# Patient Record
Sex: MALE | Race: WHITE | Employment: FULL TIME | URBAN - METROPOLITAN AREA
[De-identification: names, ages, dates, MRNs, and addresses within clinical notes are randomized per-mention and may not be internally consistent; named-entity substitution may affect disease eponyms.]

---

## 2021-05-21 ENCOUNTER — TRANSFERRED RECORDS (OUTPATIENT)
Dept: HEALTH INFORMATION MANAGEMENT | Facility: CLINIC | Age: 46
End: 2021-05-21
Payer: COMMERCIAL

## 2021-08-03 ENCOUNTER — TRANSFERRED RECORDS (OUTPATIENT)
Dept: HEALTH INFORMATION MANAGEMENT | Facility: CLINIC | Age: 46
End: 2021-08-03
Payer: COMMERCIAL

## 2021-08-03 ASSESSMENT — MIFFLIN-ST. JEOR: SCORE: 2376.32

## 2021-08-04 ENCOUNTER — TELEPHONE (OUTPATIENT)
Dept: ENDOCRINOLOGY | Facility: CLINIC | Age: 46
End: 2021-08-04

## 2021-08-04 NOTE — TELEPHONE ENCOUNTER
Called patient to discuss insurance information as entered in Epic, [employer]. Patient states this is incorrect. He has [employer] plan as he works for [employer]. Provider tele # [phone]. Call ref # [id number]. [employer] for prior authorization request form, fax to [phone].    Patient has coverage. Patient unable to have surgery at [facility] with Dr. High as he is booked to 1/17/21. Patient states he would like surgery this year yet but the sooner the better as he will be available any time. Have requested clinicals be faxed to me at [phone].

## 2021-08-10 ENCOUNTER — TELEPHONE (OUTPATIENT)
Dept: ENDOCRINOLOGY | Facility: CLINIC | Age: 46
End: 2021-08-10

## 2021-08-10 NOTE — TELEPHONE ENCOUNTER
Left VM message asking patient to call the Call Center to enter his insurance information as he will be having sleeve gastrectomy at the , currently working with [facility] in [facility] but unable to have surgery there since Dr. High is booking out to December, possibly later. Patient wants surgery this year.

## 2021-08-12 ENCOUNTER — TELEPHONE (OUTPATIENT)
Dept: ENDOCRINOLOGY | Facility: CLINIC | Age: 46
End: 2021-08-12

## 2021-08-13 ENCOUNTER — TELEPHONE (OUTPATIENT)
Dept: ENDOCRINOLOGY | Facility: CLINIC | Age: 46
End: 2021-08-13

## 2021-08-13 NOTE — TELEPHONE ENCOUNTER
Called patient to discuss surgery date for sleeve with Dr. High. Scheduled 10/12/21 [place].  Discussed I will call him to set up PAC video visit as it gets closer to the OR date. He would like to do his COVID testing closer to home, [place].

## 2021-08-16 ENCOUNTER — CARE COORDINATION (OUTPATIENT)
Dept: ENDOCRINOLOGY | Facility: CLINIC | Age: 46
End: 2021-08-16

## 2021-08-16 DIAGNOSIS — E66.01 MORBID OBESITY (H): Primary | ICD-10-CM

## 2021-08-18 ENCOUNTER — TELEPHONE (OUTPATIENT)
Dept: ENDOCRINOLOGY | Facility: CLINIC | Age: 46
End: 2021-08-18

## 2021-08-18 NOTE — TELEPHONE ENCOUNTER
Called patient to let him know we no longer have OR time on 10/12, which is the date I offered him for a sleeve, will try for 10/26/21.  Told patient we needs labs yet, CMP and lipid panel reflex to direct LDL fasting. I will fax these to [facility] and asked him to stop by to get these drawn.   Cardiology consult by Dr. Ryan Gonsalez dated 5/3/21 does not state he has clearance for bariatric surgery. I asked him to call Dr. Gonsalez's office and see if he could addend the note or put in another note clearing him. He will do this.

## 2021-08-22 ENCOUNTER — HEALTH MAINTENANCE LETTER (OUTPATIENT)
Age: 46
End: 2021-08-22

## 2021-08-25 ENCOUNTER — TELEPHONE (OUTPATIENT)
Dept: ENDOCRINOLOGY | Facility: CLINIC | Age: 46
End: 2021-08-25

## 2021-08-25 NOTE — TELEPHONE ENCOUNTER
Received a VM message from Karla PONCE at [facility] [phone] stating Dr. Gonsalez has cleared patient from a cardiology standpoint. I called [facility] and asked if the RN's note stating the doctor is giving clearance could be faxed to me at [phone].

## 2021-08-26 ENCOUNTER — CARE COORDINATION (OUTPATIENT)
Dept: ENDOCRINOLOGY | Facility: CLINIC | Age: 46
End: 2021-08-26

## 2021-08-26 ENCOUNTER — PREP FOR PROCEDURE (OUTPATIENT)
Dept: ENDOCRINOLOGY | Facility: CLINIC | Age: 46
End: 2021-08-26

## 2021-08-26 VITALS — BODY MASS INDEX: 44.1 KG/M2 | HEIGHT: 71 IN | WEIGHT: 315 LBS

## 2021-08-26 DIAGNOSIS — Z01.818 PREOP TESTING: ICD-10-CM

## 2021-08-26 DIAGNOSIS — E66.01 MORBID OBESITY (H): Primary | ICD-10-CM

## 2021-08-26 RX ORDER — CEFAZOLIN SODIUM IN 0.9 % NACL 3 G/100 ML
3 INTRAVENOUS SOLUTION, PIGGYBACK (ML) INTRAVENOUS
Status: CANCELLED | OUTPATIENT
Start: 2021-08-26

## 2021-08-26 RX ORDER — ONDANSETRON 2 MG/ML
4 INJECTION INTRAMUSCULAR; INTRAVENOUS
Status: CANCELLED | OUTPATIENT
Start: 2021-08-26

## 2021-08-26 RX ORDER — CEFAZOLIN SODIUM IN 0.9 % NACL 3 G/100 ML
3 INTRAVENOUS SOLUTION, PIGGYBACK (ML) INTRAVENOUS SEE ADMIN INSTRUCTIONS
Status: CANCELLED | OUTPATIENT
Start: 2021-08-26

## 2021-08-26 RX ORDER — ACETAMINOPHEN 325 MG/1
975 TABLET ORAL ONCE
Status: CANCELLED | OUTPATIENT
Start: 2021-08-26 | End: 2021-08-26

## 2021-08-31 ENCOUNTER — PREP FOR PROCEDURE (OUTPATIENT)
Dept: ENDOCRINOLOGY | Facility: CLINIC | Age: 46
End: 2021-08-31

## 2021-08-31 DIAGNOSIS — E66.01 MORBID OBESITY (H): Primary | ICD-10-CM

## 2021-08-31 RX ORDER — CEFAZOLIN SODIUM IN 0.9 % NACL 3 G/100 ML
3 INTRAVENOUS SOLUTION, PIGGYBACK (ML) INTRAVENOUS SEE ADMIN INSTRUCTIONS
Status: CANCELLED | OUTPATIENT
Start: 2021-08-31

## 2021-08-31 RX ORDER — ACETAMINOPHEN 325 MG/1
975 TABLET ORAL ONCE
Status: CANCELLED | OUTPATIENT
Start: 2021-08-31 | End: 2021-08-31

## 2021-08-31 RX ORDER — ONDANSETRON 2 MG/ML
4 INJECTION INTRAMUSCULAR; INTRAVENOUS
Status: CANCELLED | OUTPATIENT
Start: 2021-08-31

## 2021-08-31 RX ORDER — CEFAZOLIN SODIUM IN 0.9 % NACL 3 G/100 ML
3 INTRAVENOUS SOLUTION, PIGGYBACK (ML) INTRAVENOUS
Status: CANCELLED | OUTPATIENT
Start: 2021-08-31

## 2021-09-01 ENCOUNTER — TELEPHONE (OUTPATIENT)
Dept: ENDOCRINOLOGY | Facility: CLINIC | Age: 46
End: 2021-09-01

## 2021-09-01 DIAGNOSIS — Z01.812 BLOOD TESTS PRIOR TO TREATMENT OR PROCEDURE: Primary | ICD-10-CM

## 2021-09-01 DIAGNOSIS — E66.01 MORBID OBESITY (H): Primary | ICD-10-CM

## 2021-09-01 NOTE — TELEPHONE ENCOUNTER
FUTURE VISIT INFORMATION      SURGERY INFORMATION:    DOS 10/26 with    Christiano High MD    RECORDS REQUESTED FROM:       Primary Care Provider: RileyaCaramiro    Most recent EKG+ Tracing: 3/12/21- [facility]    Most recent ECHO: 4/13/21- [facility]    Most recent [facility]  [facility]

## 2021-09-02 ENCOUNTER — PREP FOR PROCEDURE (OUTPATIENT)
Dept: ENDOCRINOLOGY | Facility: CLINIC | Age: 46
End: 2021-09-02

## 2021-09-02 DIAGNOSIS — E66.01 MORBID OBESITY (H): Primary | ICD-10-CM

## 2021-09-02 RX ORDER — CEFAZOLIN SODIUM IN 0.9 % NACL 3 G/100 ML
3 INTRAVENOUS SOLUTION, PIGGYBACK (ML) INTRAVENOUS
Status: CANCELLED | OUTPATIENT
Start: 2021-09-02

## 2021-09-02 RX ORDER — CEFAZOLIN SODIUM IN 0.9 % NACL 3 G/100 ML
3 INTRAVENOUS SOLUTION, PIGGYBACK (ML) INTRAVENOUS SEE ADMIN INSTRUCTIONS
Status: CANCELLED | OUTPATIENT
Start: 2021-09-02

## 2021-09-02 RX ORDER — ACETAMINOPHEN 325 MG/1
975 TABLET ORAL ONCE
Status: CANCELLED | OUTPATIENT
Start: 2021-09-02 | End: 2021-09-02

## 2021-09-02 RX ORDER — ONDANSETRON 2 MG/ML
4 INJECTION INTRAMUSCULAR; INTRAVENOUS
Status: CANCELLED | OUTPATIENT
Start: 2021-09-02

## 2021-09-03 PROBLEM — E66.01 MORBID OBESITY (H): Status: ACTIVE | Noted: 2021-09-03

## 2021-09-21 ENCOUNTER — TELEPHONE (OUTPATIENT)
Dept: ENDOCRINOLOGY | Facility: CLINIC | Age: 46
End: 2021-09-21

## 2021-09-21 ENCOUNTER — MYC MEDICAL ADVICE (OUTPATIENT)
Dept: ENDOCRINOLOGY | Facility: CLINIC | Age: 46
End: 2021-09-21

## 2021-09-21 NOTE — TELEPHONE ENCOUNTER
LA Paper Work    - LA paperwork received 9/21/21    - Surgery Date: 10/26/21    - First Post-Op Date: unknown at this time    - Filled Out: 9/21/21    - Signed by Provider: 9/23/21    - Faxed to: (Fax Number & Company) 9/23/21    - Copy sent to scanning and original in file folder in Clinic 9/23/21.     Meenu Morales CMA

## 2021-09-21 NOTE — TELEPHONE ENCOUNTER
Per patient request asked that lab orders be faxed to [facility], phone [phone], fax [phone], lipid panel, CMP and COVID. Needs to be fasting for lipid panel. To have COVID testing done on 10/22, surgery scheduled 10/26/21.   HLD (hyperlipidemia)

## 2021-10-01 ENCOUNTER — TELEPHONE (OUTPATIENT)
Dept: ENDOCRINOLOGY | Facility: CLINIC | Age: 46
End: 2021-10-01

## 2021-10-01 RX ORDER — DILTIAZEM HYDROCHLORIDE 360 MG/1
360 CAPSULE, EXTENDED RELEASE ORAL DAILY
Status: ON HOLD | COMMUNITY
End: 2021-10-27

## 2021-10-01 RX ORDER — LOSARTAN POTASSIUM AND HYDROCHLOROTHIAZIDE 25; 100 MG/1; MG/1
1 TABLET ORAL DAILY
COMMUNITY

## 2021-10-01 RX ORDER — MODAFINIL 200 MG/1
200 TABLET ORAL DAILY PRN
COMMUNITY

## 2021-10-01 RX ORDER — TESTOSTERONE GEL, 1% 10 MG/G
GEL TRANSDERMAL DAILY
COMMUNITY

## 2021-10-01 RX ORDER — NALTREXONE HYDROCHLORIDE 50 MG/1
25 TABLET, FILM COATED ORAL 2 TIMES DAILY
COMMUNITY
End: 2021-11-30

## 2021-10-01 RX ORDER — PRAVASTATIN SODIUM 20 MG
20 TABLET ORAL EVERY EVENING
Status: ON HOLD | COMMUNITY
End: 2021-10-27

## 2021-10-01 RX ORDER — GABAPENTIN 300 MG/1
600 CAPSULE ORAL 3 TIMES DAILY
COMMUNITY
End: 2021-10-01

## 2021-10-01 NOTE — PHARMACY - PREOPERATIVE ASSESSMENT CENTER
Preoperative Assessment Center Medication History Note    Medication history completed on October 1, 2021 by this writer. See Epic admission navigator for prior to admission medications. Operating room staff will still need to confirm medications and last dose information on day of surgery.     Medication history interview sources  Patient interview: Yes  Care Everywhere records: Yes  Surescripts pharmacy refill records: Yes  Other (if applicable): None    Changes made to PTA medication list (reason)  Added:   --Aspirin  -- Diltiazem  --Hyzaar  --Modafinil  --Naltrexone  --Pravastatin  -- testosterone    Deleted: None    Changed: None    Additional medication history information (including reliability of information, actions taken by pharmacist):    -- No recent (within 30 days) course of antibiotics  -- No recent (within 30 days) course of systemic steroids  -- Patient declines being on any other prescription or over-the-counter medications  -- patient taking aspirin for preventative   -- patient will need to hold naltrexone 4 days prior to procedure as he is taking it for weight loss.     Prior to Admission medications    Medication Sig Last Dose Taking? Auth Provider   aspirin (ASA) 81 MG EC tablet Take 81 mg by mouth daily Taking Yes Unknown, Entered By History   diltiazem ER (TIAZAC) 360 MG 24 hr ER beaded capsule Take 360 mg by mouth daily Taking Yes Unknown, Entered By History   losartan-hydrochlorothiazide (HYZAAR) 100-25 MG tablet Take 1 tablet by mouth daily Taking Yes Unknown, Entered By History   modafinil (PROVIGIL) 200 MG tablet Take 200 mg by mouth daily as needed  Taking Yes Unknown, Entered By History   naltrexone (DEPADE/REVIA) 50 MG tablet Take 25 mg by mouth 2 times daily  Taking Yes Unknown, Entered By History   pravastatin (PRAVACHOL) 20 MG tablet Take 20 mg by mouth every evening  Taking Yes Unknown, Entered By History   testosterone (ANDROGEL/TESTIM) 50 MG/5GM (1%) topical gel Place onto the  skin daily Taking Yes Unknown, Entered By History     Medication history completed by: Elmo Cronin RP

## 2021-10-01 NOTE — TELEPHONE ENCOUNTER
Called patient, went to . Said I have made his 1-week and 1-month post-op appointments with Roxy Olmedo RD and Татьяна Smith NP on 11/2 and 11/30.   Asked that he go to his primary care on 11/1 to get his vitals taken as Татьяна's visit will be video.

## 2021-10-05 ENCOUNTER — PRE VISIT (OUTPATIENT)
Dept: SURGERY | Facility: CLINIC | Age: 46
End: 2021-10-05

## 2021-10-05 ENCOUNTER — VIRTUAL VISIT (OUTPATIENT)
Dept: SURGERY | Facility: CLINIC | Age: 46
End: 2021-10-05
Attending: SURGERY
Payer: COMMERCIAL

## 2021-10-05 ENCOUNTER — ANESTHESIA EVENT (OUTPATIENT)
Dept: SURGERY | Facility: CLINIC | Age: 46
End: 2021-10-05

## 2021-10-05 DIAGNOSIS — Z01.818 PREOP EXAMINATION: Primary | ICD-10-CM

## 2021-10-05 DIAGNOSIS — E66.01 MORBID OBESITY (H): ICD-10-CM

## 2021-10-05 PROCEDURE — 99204 OFFICE O/P NEW MOD 45 MIN: CPT | Mod: 95 | Performed by: NURSE PRACTITIONER

## 2021-10-05 ASSESSMENT — PAIN SCALES - GENERAL: PAINLEVEL: MILD PAIN (2)

## 2021-10-05 ASSESSMENT — LIFESTYLE VARIABLES: TOBACCO_USE: 1

## 2021-10-05 NOTE — H&P
Pre-Operative H & P     CC:  Preoperative exam to assess for increased cardiopulmonary risk while undergoing surgery and anesthesia.    Date of Encounter: 10/5/2021  Primary Care Physician:  No primary care provider on file.     Reason for visit:   Encounter Diagnoses   Name Primary?     Preop examination Yes     Morbid obesity (H)          HPI  Pete Amaro is a 46 year old male who presents for pre-operative H & P in preparation for GASTRECTOMY, SLEEVE, LAPAROSCOPIC and HERNIORRHAPHY, HIATAL, LAPAROSCOPIC on 10/26/21 by Dr. High  at [facility].     Pete Amaro 46 year old male with bicuspid aortic valve, aortic stenosis, hyperlipidemia, tachycardia, hypertension and history of smoking that is morbidly obese.  He has consulted with Dr. High for bariatric surgery at his [facility] office, but surgery has been scheduled here due to their covid restrictions.  He notes that he has been overweight since his teenage years.  He is interested in weight loss surgery to improve his quality of life, improve health (hypertension, lipids),  increase energy, improve sleep, improve chronic pain, become more mobile.  He has attempted weight loss in the past with Weight Watchers, Herbalife, Prolife, Medifast, keto diet, weight loss medications and exercise.  He once lost 150lbs, but wasn't able to keep it off.  The above listed procedure has now been recommended.      History is obtained from the patient and chart review      Hx of abnormal bleeding or anti-platelet use: aspirin        Prior to Admission Medications  Current Outpatient Medications   Medication Sig Dispense Refill     aspirin (ASA) 81 MG EC tablet Take 81 mg by mouth daily       diltiazem ER (TIAZAC) 360 MG 24 hr ER beaded capsule Take 360 mg by mouth daily       losartan-hydrochlorothiazide (HYZAAR) 100-25 MG tablet Take 1 tablet by mouth daily       modafinil (PROVIGIL) 200 MG tablet Take  200 mg by mouth daily as needed        naltrexone (DEPADE/REVIA) 50 MG tablet Take 25 mg by mouth 2 times daily        pravastatin (PRAVACHOL) 20 MG tablet Take 20 mg by mouth every evening        testosterone (ANDROGEL/TESTIM) 50 MG/5GM (1%) topical gel Place onto the skin daily         Family History  Family History   Problem Relation Age of Onset     No Known Problems Mother      Diabetes Father      Anesthesia Reaction No family hx of      Thrombosis No family hx of            Past Medical History:   Diagnosis Date     Aortic stenosis      Benign essential hypertension      Bicuspid aortic valve      High cholesterol      Morbid obesity (H)       Past Surgical History:   Procedure Laterality Date     VASECTOMY       wisdom teeth        Allergies   Allergen Reactions     Lisinopril Cough      Social History     Tobacco Use     Smoking status: Former Smoker     Types: Cigarettes     Smokeless tobacco: Never Used   Substance Use Topics     Alcohol use: Not Currently      Wt Readings from Last 1 Encounters:   08/03/21 147.4 kg (325 lb)        Anesthesia Evaluation   Pt has not had prior anesthetic.         ROS/MED History  The complete review of systems is negative other than noted in the HPI or here.   ENT/Pulmonary:     (+) HARLAN risk factors, snores loudly, obese, tobacco use, Past use,  (-) recent URI   Neurologic:  - neg neurologic ROS     Cardiovascular: Comment: History of tachycardia, bicuspid aortic valve    (+) Dyslipidemia hypertension-----valvular problems/murmurs type: AS Previous cardiac testing   Echo: Date: 4/2021 Results:    Stress Test: Date: 3/2021 Results:    ECG Reviewed: Date: Results:    Cath: Date: Results:      METS/Exercise Tolerance:     Hematologic:  - neg hematologic  ROS  (-) history of blood clots and history of blood transfusion   Musculoskeletal: Comment: Chronic low back pain      GI/Hepatic:  - neg GI/hepatic ROS     Renal/Genitourinary:  - neg Renal ROS     Endo:     (+) Chronic  steroid usage for Other. Date most recently used: daily testosterone gel. Obesity,     Psychiatric/Substance Use:  - neg psychiatric ROS  (-) alcohol abuse history   Infectious Disease:  - neg infectious disease ROS  (-) Recent Fever   Malignancy:  - neg malignancy ROS     Other:  - neg other ROS    (+) , H/O Chronic Pain,                PAC Discussion and Assessment    ASA Classification: 3  Case is suitable for: [place]  Anesthetic techniques and relevant risks discussed: GA                    Virtual visit -  No vitals were obtained       Physical Exam  Constitutional: Awake, alert, cooperative, no apparent distress, and appears stated age.  Eyes: Pupils equal  HENT: Normocephalic  Respiratory: non labored breathing   Neurologic: Awake, alert, oriented to name, place and time.   Neuropsychiatric: Calm, cooperative. Normal affect.        LABS/DIAGNOSTIC STUDIES:   All labs and imaging personally reviewed       Echocardiogram  4/2021  Summary:     * The estimated ejection fraction is 65-70%.     * There is moderate concentric left ventricular hypertrophy.     * Normal right ventricular systolic function.     * There is mild aortic stenosis with a peak velocity of  291 cm/s, mean   gradient of  16.71 mmHg, aortic valve area of  2.07 cm2, and an NDSI of     0.64.     * The aortic valve is bicuspid.     * Doppler interrogation of the TV is inadequate to assess pulmonary   artery   systolic pressure.     * The IVC is normal in size (< 2.1 cm), > 50% respiratory variance, RA   pressure normal at 3 mmHg.     * There is no pericardial effusion visualized.     * No prior study available for comparison.     stress test 3/2021  Summary     * Normal dobutamine stress echocardiogram with no inducible regional wall   motion abnormalities.     * Hyperdynamic LV systolic function estimated at 65 to 70%.     * Bicuspid aortic valve with mild calcific aortic stenosis with fusion of   the noncoronary and left coronary cusps.     *  Stress EKG with no significant ST changes or arrhythmias concerning for   inducible ischemia.   Stress Echo Findings     Review of images at peak dose dobutamine demonstrates adequate   augmentation   of all LV myocardial segments. There are no inducible regional wall motion   abnormalities visualized. Patient ejection fraction increased from 65-70%   to   75-80%.   ECG Findings     Baseline electrocardiogram demonstrates normal sinus rhythm with a   baseline   heart rate of 91 bpm. There is an isolated premature atrial complex. There   are   no obvious ischemic ST changes seen at baseline. Review of ECG during   dobutamine infusion and at peak heart rate did not demonstrate any   significant   ST changes or arrhythmias concerning for inducible ischemia.       Labs:  Outside labs 10/18/21  Color, Urine Yellow  Yellow    Clarity, Urine Clear  Clear    Specific Gravity, Urine 1.005 - 1.030  >=1.030    Glucose, Urine Negative  Negative    Bilirubin, Urine Negative  Negative    Ketone, Urine Negative  Negative    Blood, Urine Negative  Negative    pH, Urine 5.0 - 8.5 pH 5.5    Protein, Urine Negative  TraceAbnormal     Urobilinogen, Urine <2.0 EU/dL 0.2    Nitrite, Urine Negative  Negative    Leukocyte Esterase, Urine Negative  Negative        Sodium 136 - 146 mmol/L 140    Potassium 3.5 - 5.1 mmol/L 3.9    Chloride 98 - 107 mmol/L 106    CO2 22 - 29 mmol/L 24    Anion Gap 10.0 - 20.0 mmol/L 13.9    Creatinine 0.72 - 1.25 mg/dL 0.78    Blood Urea Nitrogen 10.0 - 20.0 mg/dL 23.9High     BUN/Creatinine Ratio 11.70 - 22.90 ratio 30.64High     Calcium 8.6 - 10.5 mg/dL 9.2    Glucose 70 - 100 mg/dL 108High     eGFR >=60 mL/min/1.73m(2) >60    Total Protein 6.0 - 8.0 g/dL 7.2    Albumin 3.5 - 5.0 g/dL 4.1    Globulin 2.0 - 3.5 g/dL 3.1    Albumin/Globulin Ratio 1.0 - 2.0  1.3    Aspartate Aminotransferase (AST) 5 - 41 U/L 27    Alanine Aminotransferase (ALT) 8 - 45 U/L 40    Alkaline Phosphatase 50 - 136 U/L 60    Bilirubin,  Total 0.2 - 1.2 mg/dL 0.4    eCrCl (Rx) - Adults  mL/min 170.9      WBC Count 3.3 - 10.9 10(3)/uL 9.0    RBC Count 3.82 - 5.62 10(6)/uL 5.77High     Hemoglobin 12.9 - 16.9 g/dL 17.4High     Hematocrit 37.0 - 47.0 % 49.9High     MCV 82.1 - 99.1 fL 86.5    MCH 28.4 - 34.8 pg 30.2    MCHC 32.0 - 36.0 g/dL 34.9    RDW 10.0 - 16.2 % 13.7    Platelets 130 - 368 10(3)/uL 200    MPV 6.5 - 10.0 fL 8.8    % Neutrophils 47.0 - 80.0 % 53.0    % Lymphocytes 12.0 - 42.0 % 40.0    % Midrange 5.0 - 9.0 % 7.0    Abs Neutrophils 1.0 - 7.0 10(3)/uL 4.8    Abs Lymphocytes 1.0 - 3.2 10(3)/uL 3.6High     Abs Midrange 0.0 - 1.0 10(3)/uL 0.6            The patient's records and results personally reviewed by this provider.     Outside records reviewed from: care everywhere      ASSESSMENT and PLAN    Pete Amaro 46 year old male scheduled for GASTRECTOMY, SLEEVE, LAPAROSCOPIC and HERNIORRHAPHY, HIATAL, LAPAROSCOPIC    on 10/26/21 by Dr. High in treatment of morbid obesity.  PAC referral for risk assessment and optimization for anesthesia with comorbid conditions of:  Bicuspid aortic valve, aortic stenosis, hyperlipidemia, tachycardia, hypertension and history of smoking.    Pre-operative considerations:  1.  Cardiac:  Functional status- METS >4.  He is active as a paramedic.  He had a stress test in April 2021 that was negative for ischemia and showed no wall motion abnormalities,but showed a bicuspid aortic valve so a follow up echo was ordered.  The echocardiogram showed the bicuspid aortic valve and mild aortic stenosis with a mean gradient of 16.71, EF 60-70% and moderate LVH.  Hypertension is managed with losartan/hctz; hold DOS.  He notes he is on diltiazem for management of tachycardia. Intermediate  risk surgery with 0.4% risk of major adverse cardiac event.   2.  Pulm:  Airway - ERIC.  HARLAN risk: high.  He notes that he has never been officially diagnosed with sleep apnea, but that his wife who is a dental hygienist made  him an oral appliance to manage snoring.  Encouraged him to bring the dental appliance along.  Monitor closely post-op.  On provigil for shift work; hold DOS.  Quit smoking in 2010.   3.  GI:  Risk of PONV score = 2.  If > 2, anti-emetic intervention recommended.    4. Endo:  He is morbidly obese with a BMI >40.  He reports that he has met his goal weight for surgery.  Hold naltrexone 4 days prior to surgery.    5. Heme:  Hold aspirin 7 days prior to surgery.      VTE risk: 1.8%    Virtual visit - Please refer to the physical examination documented by the anesthesiologist in the anesthesia record on the day of surgery      Patient is optimized and is acceptable candidate for the proposed procedure.  No further diagnostic evaluation is needed.               Jennifer Goetz DNP, RN, APRN          Video-Visit Details    Type of service:  Video Visit    Patient verbally consented to video service today: YES      Video Start Time: 0751  Video End Time (time video stopped): 0809    Originating Location (pt. Location): Home    Distant Location (provider location):  provider's home    Mode of Communication:  Video Conference via MiFi      On the day of service:     Prep time: 8 minutes  Visit time: 18 minutes  Documentation time: 23 minutes  ------------------------------------------  Total time: 49 minutes      ZEINAB Dawson CNP  Preoperative Assessment Center  [facility]  Clinic and Surgery Center  Phone: [phone]  Fax: [phone]

## 2021-10-05 NOTE — PATIENT INSTRUCTIONS
Preparing for Your Surgery      Name:  Pete Amaro   MRN:  [id number]   :  1975   Today's Date:  10/5/2021       Arriving for surgery:  Surgery date:  10/26/2021  Arrival time:  2:30 pm    Restrictions due to COVID 19:       One visitor is allowed in the Pre Op area.       When you go into surgery, one visitor is allowed to wait in the Surgery Waiting Room       (provided there is enough space to social distance).         In hospital patients are allowed 1 visitor per day       The visitor may change daily     Visiting Hours: 8 am - 8:30 pm   No ill visitors.   All visitors must wear face mask.    [employer] parking is available for anyone with mobility limitations or disabilities.  ([place]  24 hours/ 7 days a week; [facility]  7 am- 3:30 pm, Mon- Fri)    Please come to:     [facility] [address]  [address] [address]     -    Please proceed to [address] on the 3rd floor. [phone]?     - ?If you are in need of directions, wheelchair or escort please stop at the Information Desk in the lobby.      What can I eat or drink?  -  Follow diet restrictions as directed by surgeon  -  You may have clear liquids until 2 hours before surgery. (Until 2:30 pm arrival time)    Examples of clear liquids:  Water  Clear broth  Juices (apple, white grape, white cranberry  and cider) without pulp  Noncarbonated, powder based beverages  (lemonade and Fercho-Aid)  Coffee or tea (without milk or cream)  Gatorade    -  No Alcohol for at least 24 hours before surgery     Which medicines can I take?    Hold Aspirin for 7 days before surgery.     Hold Multivitamins for 7 days before surgery.  Hold Supplements for 7 days before surgery.  Hold Ibuprofen (Advil, Motrin) for 1 day before surgery--unless otherwise directed by surgeon.  Hold Naproxen (Aleve) for 4 days before surgery.    Hold Naltrexone for 4 days before surgery    -  DO NOT take these  medications the day of surgery:  Losartan hydrochlorothiazide   Modafinil (provigil)      -  PLEASE TAKE these medications the day of surgery:  Diltiazem      How do I prepare myself?  - Please take 2 showers before surgery using Scrubcare or Hibiclens soap.    Use this soap only from the neck to your toes.     Leave the soap on your skin for one minute--then rinse thoroughly.      You may use your own shampoo and conditioner; no other hair products.   - Please remove all jewelry and body piercings.  - No lotions, deodorants or fragrance.  - No makeup or fingernail polish.   - Bring your ID and insurance card.    -If you have a Deep Brain Stimulator, Spinal Cord Stimulator or any neuro stimulator device---you must bring the remote control to the hospital     - All patients are required to have a Covid-19 test within 4 days of surgery/procedure.      -Patients will be contacted by the [facility] scheduling team within 1 week of surgery to make an appointment.      - Patients may call the Scheduling team at [phone] if they have not been scheduled within 4 days of  surgery.        Questions or Concerns:    - For any questions regarding the day of surgery or your hospital stay, please contact the Pre Admission Nursing Office at [phone].       - If you have health changes between today and your surgery please call your surgeon.       For questions after surgery please call your surgeons office.

## 2021-10-05 NOTE — PROGRESS NOTES
Pete is a 46 year old who is being evaluated via a billable video visit.      How would you like to obtain your AVS? MyChart    HPI         Review of Systems         Objective    Vitals - Patient Reported  Pain Score: Mild Pain (2)        Physical Exam     MANDEEP Jolly LPN

## 2021-10-05 NOTE — ANESTHESIA PREPROCEDURE EVALUATION
Anesthesia Pre-Procedure Evaluation    Patient: Pete Amaro   MRN: [id number] : 1975        Preoperative Diagnosis: * No surgery found *    Procedure :   Video Visit       Past Medical History:   Diagnosis Date     Aortic stenosis      Benign essential hypertension      Bicuspid aortic valve      High cholesterol      Morbid obesity (H)       Past Surgical History:   Procedure Laterality Date     VASECTOMY       wisdom teeth        Allergies   Allergen Reactions     Lisinopril Cough      Social History     Tobacco Use     Smoking status: Former Smoker     Types: Cigarettes     Smokeless tobacco: Never Used   Substance Use Topics     Alcohol use: Not Currently      Wt Readings from Last 1 Encounters:   21 147.4 kg (325 lb)        Anesthesia Evaluation   Pt has not had prior anesthetic         ROS/MED HX  ENT/Pulmonary:     (+) HARLAN risk factors, snores loudly, obese, tobacco use, Past use,  (-) recent URI   Neurologic:  - neg neurologic ROS     Cardiovascular: Comment: History of tachycardia, bicuspid aortic valve    (+) Dyslipidemia hypertension-----valvular problems/murmurs type: AS Previous cardiac testing   Echo: Date: 2021 Results:    Stress Test: Date: 3/2021 Results:    ECG Reviewed: Date: Results:    Cath: Date: Results:      METS/Exercise Tolerance:     Hematologic:  - neg hematologic  ROS  (-) history of blood clots and history of blood transfusion   Musculoskeletal: Comment: Chronic low back pain      GI/Hepatic:  - neg GI/hepatic ROS     Renal/Genitourinary:  - neg Renal ROS     Endo:     (+) Chronic steroid usage for Other. Date most recently used: daily testosterone gel. Obesity,     Psychiatric/Substance Use:  - neg psychiatric ROS  (-) alcohol abuse history   Infectious Disease:  - neg infectious disease ROS  (-) Recent Fever   Malignancy:  - neg malignancy ROS     Other:  - neg other ROS    (+) , H/O Chronic Pain,        Physical Exam    Airway             Respiratory Devices  and Support         Dental       (+) chipped      Cardiovascular             Pulmonary                   OUTSIDE LABS:  CBC: No results found for: WBC, HGB, HCT, PLT  BMP: No results found for: NA, POTASSIUM, CHLORIDE, CO2, BUN, CR, GLC  COAGS: No results found for: PTT, INR, FIBR  POC: No results found for: BGM, HCG, HCGS  HEPATIC: No results found for: ALBUMIN, PROTTOTAL, ALT, AST, GGT, ALKPHOS, BILITOTAL, BILIDIRECT, MATT  OTHER: No results found for: PH, LACT, A1C, CORWIN, PHOS, MAG, LIPASE, AMYLASE, TSH, T4, T3, CRP, SED          PAC Discussion and Assessment    ASA Classification: 3  Case is suitable for: [place]  Anesthetic techniques and relevant risks discussed: GA                  PAC Resident/NP Anesthesia Assessment: Pete Amaro 46 year old male scheduled for GASTRECTOMY, SLEEVE, LAPAROSCOPIC and HERNIORRHAPHY, HIATAL, LAPAROSCOPIC    on 10/26/21 by Dr. High in treatment of morbid obesity.  PAC referral for risk assessment and optimization for anesthesia with comorbid conditions of:  Bicuspid aortic valve, aortic stenosis, hyperlipidemia, tachycardia, hypertension and history of smoking.    Pre-operative considerations:  1.  Cardiac:  Functional status- METS >4.  He is active as a paramedic.  He had a stress test in April 2021 that was negative for ischemia and showed no wall motion abnormalities,but showed a bicuspid aortic valve so a follow up echo was ordered.  The echocardiogram showed the bicuspid aortic valve and mild aortic stenosis with a mean gradient of 16.71, EF 60-70% and moderate LVH.  Hypertension is managed with losartan/hctz; hold DOS.  He notes he is on diltiazem for management of tachycardia. Intermediate  risk surgery with 0.4% risk of major adverse cardiac event.   2.  Pulm:  Airway - ERIC.  HARLAN risk: high.  He notes that he has never been officially diagnosed with sleep apnea, but that his wife who is a dental hygienist made him an oral appliance to manage snoring.   Encouraged him to bring the dental appliance along.  Monitor closely post-op.  On provigil for shift work; hold DOS.  Quit smoking in 2010.   3.  GI:  Risk of PONV score = 2.  If > 2, anti-emetic intervention recommended.    4. Endo:  He is morbidly obese with a BMI >40.  He reports that he has met his goal weight for surgery.  Hold naltrexone 4 days prior to surgery.    5. Heme:  Hold aspirin 7 days prior to surgery.    VTE risk: 1.8%    Virtual visit - Please refer to the physical examination documented by the anesthesiologist in the anesthesia record on the day of surgery      Patient is optimized and is acceptable candidate for the proposed procedure.  No further diagnostic evaluation is needed.       **For further details of assessment, testing, and physical exam please see H and P completed on same date.          Jennifer Goetz DNP, RN, APRN      Reviewed and Signed by [facility] Mid-Level Provider/Resident  Mid-Level Provider/Resident: Jennifer Goetz DNP, RN, APRN  Date: 10/5/21  Time: 0824                               ZEINAB Dawson CNP

## 2021-10-11 ENCOUNTER — MYC MEDICAL ADVICE (OUTPATIENT)
Dept: ENDOCRINOLOGY | Facility: CLINIC | Age: 46
End: 2021-10-11

## 2021-10-11 NOTE — TELEPHONE ENCOUNTER
Per Email sent to me from [facility] the nurse patient is request 4-6 weeks off of Short term disability so I updated his paper work from    Begin 10/26/21   Ending 12/7/21    Edit paper work     Faxed to [facility] 10/11/21  Re-scan new form into pt chart  Sent pt [employer] message

## 2021-10-12 NOTE — TELEPHONE ENCOUNTER
Called [employer] spoke with Danny from [employer] verifying that they received edit dates from 10/26/21 to 12/7/21 [facility] edit new dates

## 2021-10-13 ENCOUNTER — TELEPHONE (OUTPATIENT)
Dept: SURGERY | Facility: CLINIC | Age: 46
End: 2021-10-13

## 2021-10-13 NOTE — TELEPHONE ENCOUNTER
Health Call Center    Phone Message    May a detailed message be left on voicemail: yes     Reason for Call: Other: Gisela from [facility] in [place] is calling in requesting Lab orders to be sent to them. She states that they received orders for labs, but they were not signed and they would need to have them sent again with signatures. Their fax number is [phone]. Please respond accordingly.     Action Taken: Message routed to:  [facility] PAC    Travel Screening: Not Applicable

## 2021-10-17 ENCOUNTER — HEALTH MAINTENANCE LETTER (OUTPATIENT)
Age: 46
End: 2021-10-17

## 2021-10-18 ENCOUNTER — TRANSFERRED RECORDS (OUTPATIENT)
Dept: HEALTH INFORMATION MANAGEMENT | Facility: CLINIC | Age: 46
End: 2021-10-18

## 2021-10-20 ENCOUNTER — VIRTUAL VISIT (OUTPATIENT)
Dept: ENDOCRINOLOGY | Facility: CLINIC | Age: 46
End: 2021-10-20
Payer: COMMERCIAL

## 2021-10-20 DIAGNOSIS — E66.01 MORBID OBESITY (H): Primary | ICD-10-CM

## 2021-10-20 PROCEDURE — 99207 PR NO CHARGE NURSE ONLY: CPT | Mod: GT

## 2021-10-20 RX ORDER — ONDANSETRON 4 MG/1
4 TABLET, ORALLY DISINTEGRATING ORAL EVERY 8 HOURS PRN
Qty: 15 TABLET | Refills: 0 | Status: SHIPPED | OUTPATIENT
Start: 2021-10-20

## 2021-10-20 RX ORDER — AMOXICILLIN 250 MG
2 CAPSULE ORAL DAILY PRN
Qty: 30 TABLET | Refills: 1 | Status: SHIPPED | OUTPATIENT
Start: 2021-10-20 | End: 2021-11-30

## 2021-10-20 RX ORDER — HYOSCYAMINE SULFATE 0.125 MG
0.12 TABLET ORAL EVERY 4 HOURS PRN
Qty: 30 TABLET | Refills: 1 | Status: SHIPPED | OUTPATIENT
Start: 2021-10-20

## 2021-10-20 NOTE — PATIENT INSTRUCTIONS
Pete Amaro,    Below is a recap of our conversation regarding your upcoming Sleeve Gastrectomy on 10/26/21.    DAY BEFORE YOUR SURGERY     - Follow a clear liquid diet.  Stay away from red liquids and liquids with pulp.     - Ensure you are well hydrated all day!     - Nothing to eat after midnight.  You may have clear liquids up to 3 hours before your surgery.      - Take your medications as directed from the PAC clinic.    SHOWER    - Follow instructions for pre-op shower using the required soap (chlorhexidine).      - You will take a shower the night before surgery and a shower the morning of surgery.  The soap can be very drying.  Do not put lotion on.    TRANSPORTATION & CARE    - You will need a  to take you to the hospital the day of surgery.    - You will need a  to pick you up from the hospital the day of discharge (usually the afternoon/evening the day after surgery).    - You will need someone to stay with you for the first 1-2 days after surgery, especially if you are taking prescription pain medicine.      AFTER SURGERY    MEDICATIONS     - Depending on the size and number of medications you take, you may need to space/change the time you take your medication so you do not overfill your stomach.   - Make sure you follow-up with your primary provider to make medication changes needed.   - If you have diabetes, follow-up with your provider that orders your diabetes medications and check your blood sugar regularly.      You will receive the following prescriptions at discharge (unless you are allergic to or have other reasons not to take them):      Prilosec (omeprazole): This medication is for control of stomach acid.  Open the capsule and put in water, protein shake or other approve liquid.  Take as directed.  (Please disregard the prescription bottle instructions to not open the capsule).  If you are currently on a medication for GERD or Reflux, you will just continue that  medication.    Levsin (hyoscyamine): This medication is to help control spasms/cramping in the stomach and intestines.  Take as needed for cramping.      Zofran (ondansetron):  This medication is for nausea.  The medication is to be placed on the tongue and allowed to dissolve. Take as directed for nausea.    Senna: This medication is a stool softener:  Take as directed.  You may cut it in half to make it easier to swallow.  It is important to take this medication if you are taking a narcotic pain medicine as the pain medication can be constipating.  Senna can be purchased over-the-counter.      Narcotic pain medicine: Take as directed for pain.  If you are not having a lot of pain, you can take Tylenol or Extra Strength Tylenol per the bottle instructions.  The pain medicine can cause constipation.  Do not drive while taking narcotic pain medication.      PAIN CONTROL    - Take your pain medicine as needed, as directed.    - You can use Tylenol or Tylenol Extra Strength if you are not having a lot of pain and also to supplement during the day.  DO NOT TAKE NSAIDS: IBUPROFEN, ASPIRIN OR NAPROXEN.    - Use an ice pack on the incisions for the first 24 - 48 hours:  Use a barrier between the ice pack and the skin.  Do not leave the ice on longer than 20 minutes at each time.      - Change positions frequently.  Walking around once an hour will also help.    - You may also try a heating pad on your abdomen to help soothe cramping.  Use a barrier between the heating pad and your skin.  Do not leave on longer than 20 minutes at each time.    HYDRATION    - You will be provided with a small medicine cup after surgery.  It holds one ounce of fluid (30 mL).  You need to drink one of these (1 oz) every 10 to 15 .    - Your goal is 48 to 64 ounces each day.  This amount will help prevent dehydration.      - Keep a tally sheet next to you and lin each time you drink one ounce of fluid.  You should have at least 4-6 marks for  each hour.    - Try keeping a water bottle by your bed.  Drink a little before going to sleep, if you wake in the middle of the night, and in the morning before getting out of bed.    - Keep an eye on your urine.  It is a good indicator of your hydration status.  Your urine should be clear yellow or clear light yellow.      DIET    For Dr. High Patients:     - You will be following the Stage 2 - Full Liquid diet upon discharge.  Ensure you have a variety of proper full liquids at home to support you in taking in the proper nutrition.     - Please refer to the Stage 2 - Full Liquid diet handout provided by the Dietician.      BREATHING AND ACTIVITY    - Use your incentive spirometer each hour while awake.  Sitting up or standing, take 5 - 10 slow, deep breaths each time, focusing on expanding your lungs.  This should be done for the first couple of weeks after surgery.    - Get up and walk around each hour while you are awake - at least 10 minutes.  Walking will help with circulation, bowel regulation and will help you feel better.    -  Do not lift anything greater than 10 lb for the first 4 weeks.    WOUND  CARE  You may have surgical glue or steri-strips over your incision after surgery.    - Surgical glue: looks like a clear film over your incisions and will wear off a little at a time over the first 7-10 days after surgery.      - Steri-Strips: Adhesive strips of tape over your incisions.  They will fall off over the first 7-10 days after surgery.    Showering: you may shower the day you get home unless your surgeon tells you differently.    - Wash gently around incisions with warm soapy water, rinse well, and gently pat dry.    - No tub baths until all incisions are healed.      FOLLOW-UP CALL    - You will receive a post-op phone call two days after surgery to check in and see how you are doing (If your surgery is on a Friday, your phone call will be on the Monday following your surgery).  You can always  send us a message via [employer] and the Get Well Loop ken.    GET WELL [employer]  [email or web address]    - Track your fluid intake!    - Reminders to set up follow up appointments.    - Communicate with nursing staff.    - Please use [employer] for any concerns regarding your health.    Please let us know if you have any additional questions.    Sincerely,    [facility]oma Barth RN, BSN  RN Care Coordinator  Bariatric Surgery and Comprehensive Weight Management  Phone: [phone]

## 2021-10-20 NOTE — PROGRESS NOTES
This patient is having sleeve gastrectomy by Dr. High.    The following handouts were reviewed with the patient :  Before Your Surgery, Patient Checklist, Weight Loss Surgery Pre-operative Class, Preop Recommendations Quick Reference Guide, History and Physical, Medications to Avoid, Shower or Bathing Before Surgery, Bowel Preparation, Powerful Choices and [place] [employer] [facility] [employer] Directive.  Questions were addressed and understanding of content was verbalized.  Contact information was provided.    Patient goal weight: 336  Weight today: 317    Call [phone] Angel Newman to confirm surgery date.     Call [phone] to schedule your pre-operative history and physical with our PAC clinic.      Patient currently taking opioid/narcotic pain medications? No.

## 2021-10-20 NOTE — LETTER
10/20/2021       RE: Pete Amaro  [id number] [address] [address]     Dear Colleague,    Thank you for referring your patient, Pete Amaro, to the [facility] [place] [facility]. Please see a copy of my visit note below.    This patient is having sleeve gastrectomy by Dr. High.    The following handouts were reviewed with the patient :  Before Your Surgery, Patient Checklist, Weight Loss Surgery Pre-operative Class, Preop Recommendations Quick Reference Guide, History and Physical, Medications to Avoid, Shower or Bathing Before Surgery, Bowel Preparation, Powerful Choices and [place] [employer] [facility] Directive.  Questions were addressed and understanding of content was verbalized.  Contact information was provided.    Patient goal weight: 336  Weight today: 317    Call [phone] Angel Newman to confirm surgery date.     Call [phone] to schedule your pre-operative history and physical with our PAC clinic.      Patient currently taking opioid/narcotic pain medications? No.       Again, thank you for allowing me to participate in the care of your patient.      Sincerely,    Homa Barth RN

## 2021-10-25 ENCOUNTER — ANESTHESIA EVENT (OUTPATIENT)
Dept: SURGERY | Facility: CLINIC | Age: 46
DRG: 621 | End: 2021-10-25
Payer: COMMERCIAL

## 2021-10-26 ENCOUNTER — HOSPITAL ENCOUNTER (INPATIENT)
Facility: CLINIC | Age: 46
LOS: 1 days | Discharge: HOME | DRG: 621 | End: 2021-10-27
Attending: SURGERY | Admitting: SURGERY
Payer: COMMERCIAL

## 2021-10-26 ENCOUNTER — ANESTHESIA (OUTPATIENT)
Dept: SURGERY | Facility: CLINIC | Age: 46
DRG: 621 | End: 2021-10-26
Payer: COMMERCIAL

## 2021-10-26 DIAGNOSIS — Z98.84 S/P LAPAROSCOPIC SLEEVE GASTRECTOMY: Primary | ICD-10-CM

## 2021-10-26 DIAGNOSIS — E66.01 MORBID OBESITY (H): ICD-10-CM

## 2021-10-26 LAB — GLUCOSE BLDC GLUCOMTR-MCNC: 106 MG/DL (ref 70–99)

## 2021-10-26 PROCEDURE — 250N000011 HC RX IP 250 OP 636: Performed by: STUDENT IN AN ORGANIZED HEALTH CARE EDUCATION/TRAINING PROGRAM

## 2021-10-26 PROCEDURE — 43775 LAP SLEEVE GASTRECTOMY: CPT | Mod: GC | Performed by: SURGERY

## 2021-10-26 PROCEDURE — 258N000003 HC RX IP 258 OP 636: Performed by: STUDENT IN AN ORGANIZED HEALTH CARE EDUCATION/TRAINING PROGRAM

## 2021-10-26 PROCEDURE — 88307 TISSUE EXAM BY PATHOLOGIST: CPT | Mod: TC | Performed by: SURGERY

## 2021-10-26 PROCEDURE — 250N000011 HC RX IP 250 OP 636: Performed by: ANESTHESIOLOGY

## 2021-10-26 PROCEDURE — 250N000011 HC RX IP 250 OP 636: Performed by: SURGERY

## 2021-10-26 PROCEDURE — 250N000009 HC RX 250: Performed by: NURSE ANESTHETIST, CERTIFIED REGISTERED

## 2021-10-26 PROCEDURE — 250N000009 HC RX 250: Performed by: SURGERY

## 2021-10-26 PROCEDURE — 258N000003 HC RX IP 258 OP 636: Performed by: NURSE ANESTHETIST, CERTIFIED REGISTERED

## 2021-10-26 PROCEDURE — 360N000077 HC SURGERY LEVEL 4, PER MIN: Performed by: SURGERY

## 2021-10-26 PROCEDURE — 120N000002 HC R&B MED SURG/OB UMMC

## 2021-10-26 PROCEDURE — 272N000001 HC OR GENERAL SUPPLY STERILE: Performed by: SURGERY

## 2021-10-26 PROCEDURE — 710N000010 HC RECOVERY PHASE 1, LEVEL 2, PER MIN: Performed by: SURGERY

## 2021-10-26 PROCEDURE — 250N000013 HC RX MED GY IP 250 OP 250 PS 637: Performed by: NURSE ANESTHETIST, CERTIFIED REGISTERED

## 2021-10-26 PROCEDURE — 250N000009 HC RX 250: Performed by: STUDENT IN AN ORGANIZED HEALTH CARE EDUCATION/TRAINING PROGRAM

## 2021-10-26 PROCEDURE — 258N000003 HC RX IP 258 OP 636: Performed by: ANESTHESIOLOGY

## 2021-10-26 PROCEDURE — 250N000009 HC RX 250: Performed by: ANESTHESIOLOGY

## 2021-10-26 PROCEDURE — 0DB64Z3 EXCISION OF STOMACH, PERCUTANEOUS ENDOSCOPIC APPROACH, VERTICAL: ICD-10-PCS | Performed by: SURGERY

## 2021-10-26 PROCEDURE — 258N000003 HC RX IP 258 OP 636: Performed by: SURGERY

## 2021-10-26 PROCEDURE — 250N000013 HC RX MED GY IP 250 OP 250 PS 637: Performed by: SURGERY

## 2021-10-26 PROCEDURE — 370N000017 HC ANESTHESIA TECHNICAL FEE, PER MIN: Performed by: SURGERY

## 2021-10-26 PROCEDURE — 999N000141 HC STATISTIC PRE-PROCEDURE NURSING ASSESSMENT: Performed by: SURGERY

## 2021-10-26 PROCEDURE — 88307 TISSUE EXAM BY PATHOLOGIST: CPT | Mod: 26 | Performed by: PATHOLOGY

## 2021-10-26 PROCEDURE — 250N000013 HC RX MED GY IP 250 OP 250 PS 637: Performed by: ANESTHESIOLOGY

## 2021-10-26 RX ORDER — OXYCODONE HYDROCHLORIDE 5 MG/1
5 TABLET ORAL EVERY 4 HOURS PRN
Status: DISCONTINUED | OUTPATIENT
Start: 2021-10-26 | End: 2021-10-26 | Stop reason: HOSPADM

## 2021-10-26 RX ORDER — BUPIVACAINE HYDROCHLORIDE 2.5 MG/ML
INJECTION, SOLUTION EPIDURAL; INFILTRATION; INTRACAUDAL PRN
Status: DISCONTINUED | OUTPATIENT
Start: 2021-10-26 | End: 2021-10-26 | Stop reason: HOSPADM

## 2021-10-26 RX ORDER — DEXMEDETOMIDINE HYDROCHLORIDE 4 UG/ML
0.2-1.2 INJECTION, SOLUTION INTRAVENOUS CONTINUOUS
Status: DISCONTINUED | OUTPATIENT
Start: 2021-10-26 | End: 2021-10-26 | Stop reason: HOSPADM

## 2021-10-26 RX ORDER — AMOXICILLIN 250 MG
2 CAPSULE ORAL 2 TIMES DAILY
Status: DISCONTINUED | OUTPATIENT
Start: 2021-10-26 | End: 2021-10-27 | Stop reason: HOSPADM

## 2021-10-26 RX ORDER — DILTIAZEM HYDROCHLORIDE 360 MG/1
360 CAPSULE, EXTENDED RELEASE ORAL DAILY
Status: DISCONTINUED | OUTPATIENT
Start: 2021-10-27 | End: 2021-10-26

## 2021-10-26 RX ORDER — CARBOXYMETHYLCELLULOSE SODIUM 5 MG/ML
1 SOLUTION/ DROPS OPHTHALMIC 3 TIMES DAILY PRN
Status: DISCONTINUED | OUTPATIENT
Start: 2021-10-26 | End: 2021-10-27 | Stop reason: HOSPADM

## 2021-10-26 RX ORDER — NALOXONE HYDROCHLORIDE 0.4 MG/ML
0.4 INJECTION, SOLUTION INTRAMUSCULAR; INTRAVENOUS; SUBCUTANEOUS
Status: DISCONTINUED | OUTPATIENT
Start: 2021-10-26 | End: 2021-10-27 | Stop reason: HOSPADM

## 2021-10-26 RX ORDER — PROCHLORPERAZINE MALEATE 5 MG
10 TABLET ORAL EVERY 6 HOURS PRN
Status: DISCONTINUED | OUTPATIENT
Start: 2021-10-26 | End: 2021-10-27 | Stop reason: HOSPADM

## 2021-10-26 RX ORDER — OXYCODONE HYDROCHLORIDE 5 MG/1
5 TABLET ORAL
Status: DISCONTINUED | OUTPATIENT
Start: 2021-10-26 | End: 2021-10-27 | Stop reason: HOSPADM

## 2021-10-26 RX ORDER — NALOXONE HYDROCHLORIDE 0.4 MG/ML
0.2 INJECTION, SOLUTION INTRAMUSCULAR; INTRAVENOUS; SUBCUTANEOUS
Status: DISCONTINUED | OUTPATIENT
Start: 2021-10-26 | End: 2021-10-27 | Stop reason: HOSPADM

## 2021-10-26 RX ORDER — ONDANSETRON 4 MG/1
4 TABLET, ORALLY DISINTEGRATING ORAL EVERY 30 MIN PRN
Status: DISCONTINUED | OUTPATIENT
Start: 2021-10-26 | End: 2021-10-26 | Stop reason: HOSPADM

## 2021-10-26 RX ORDER — LIDOCAINE HYDROCHLORIDE 10 MG/ML
INJECTION, SOLUTION INFILTRATION; PERINEURAL PRN
Status: DISCONTINUED | OUTPATIENT
Start: 2021-10-26 | End: 2021-10-26

## 2021-10-26 RX ORDER — ACETAMINOPHEN 325 MG/1
975 TABLET ORAL ONCE
Status: DISCONTINUED | OUTPATIENT
Start: 2021-10-26 | End: 2021-10-26 | Stop reason: HOSPADM

## 2021-10-26 RX ORDER — SODIUM CHLORIDE, SODIUM LACTATE, POTASSIUM CHLORIDE, CALCIUM CHLORIDE 600; 310; 30; 20 MG/100ML; MG/100ML; MG/100ML; MG/100ML
INJECTION, SOLUTION INTRAVENOUS CONTINUOUS
Status: DISCONTINUED | OUTPATIENT
Start: 2021-10-26 | End: 2021-10-27

## 2021-10-26 RX ORDER — METOPROLOL TARTRATE 1 MG/ML
1-2 INJECTION, SOLUTION INTRAVENOUS EVERY 5 MIN PRN
Status: DISCONTINUED | OUTPATIENT
Start: 2021-10-26 | End: 2021-10-26 | Stop reason: HOSPADM

## 2021-10-26 RX ORDER — ONDANSETRON 2 MG/ML
4 INJECTION INTRAMUSCULAR; INTRAVENOUS
Status: DISCONTINUED | OUTPATIENT
Start: 2021-10-26 | End: 2021-10-26 | Stop reason: HOSPADM

## 2021-10-26 RX ORDER — ONDANSETRON 2 MG/ML
4 INJECTION INTRAMUSCULAR; INTRAVENOUS EVERY 30 MIN PRN
Status: DISCONTINUED | OUTPATIENT
Start: 2021-10-26 | End: 2021-10-26 | Stop reason: HOSPADM

## 2021-10-26 RX ORDER — MEPERIDINE HYDROCHLORIDE 25 MG/ML
12.5 INJECTION INTRAMUSCULAR; INTRAVENOUS; SUBCUTANEOUS EVERY 5 MIN PRN
Status: DISCONTINUED | OUTPATIENT
Start: 2021-10-26 | End: 2021-10-26 | Stop reason: HOSPADM

## 2021-10-26 RX ORDER — LIDOCAINE 40 MG/G
CREAM TOPICAL
Status: DISCONTINUED | OUTPATIENT
Start: 2021-10-26 | End: 2021-10-27 | Stop reason: HOSPADM

## 2021-10-26 RX ORDER — HALOPERIDOL 2 MG/ML
SOLUTION ORAL PRN
Status: DISCONTINUED | OUTPATIENT
Start: 2021-10-26 | End: 2021-10-26

## 2021-10-26 RX ORDER — SODIUM CHLORIDE, SODIUM LACTATE, POTASSIUM CHLORIDE, CALCIUM CHLORIDE 600; 310; 30; 20 MG/100ML; MG/100ML; MG/100ML; MG/100ML
INJECTION, SOLUTION INTRAVENOUS CONTINUOUS
Status: DISCONTINUED | OUTPATIENT
Start: 2021-10-26 | End: 2021-10-26 | Stop reason: HOSPADM

## 2021-10-26 RX ORDER — OXYCODONE HYDROCHLORIDE 10 MG/1
10 TABLET ORAL
Status: DISCONTINUED | OUTPATIENT
Start: 2021-10-26 | End: 2021-10-27 | Stop reason: HOSPADM

## 2021-10-26 RX ORDER — ONDANSETRON 4 MG/1
4 TABLET, ORALLY DISINTEGRATING ORAL EVERY 6 HOURS PRN
Status: DISCONTINUED | OUTPATIENT
Start: 2021-10-26 | End: 2021-10-27 | Stop reason: HOSPADM

## 2021-10-26 RX ORDER — HYDROMORPHONE HCL IN WATER/PF 6 MG/30 ML
0.2 PATIENT CONTROLLED ANALGESIA SYRINGE INTRAVENOUS
Status: DISCONTINUED | OUTPATIENT
Start: 2021-10-26 | End: 2021-10-27

## 2021-10-26 RX ORDER — KETAMINE HYDROCHLORIDE 10 MG/ML
INJECTION INTRAMUSCULAR; INTRAVENOUS PRN
Status: DISCONTINUED | OUTPATIENT
Start: 2021-10-26 | End: 2021-10-26

## 2021-10-26 RX ORDER — LIDOCAINE HYDROCHLORIDE 20 MG/ML
INJECTION, SOLUTION INFILTRATION; PERINEURAL PRN
Status: DISCONTINUED | OUTPATIENT
Start: 2021-10-26 | End: 2021-10-26

## 2021-10-26 RX ORDER — CEFAZOLIN SODIUM IN 0.9 % NACL 3 G/100 ML
3 INTRAVENOUS SOLUTION, PIGGYBACK (ML) INTRAVENOUS SEE ADMIN INSTRUCTIONS
Status: DISCONTINUED | OUTPATIENT
Start: 2021-10-26 | End: 2021-10-26 | Stop reason: HOSPADM

## 2021-10-26 RX ORDER — ASPIRIN 81 MG/1
81 TABLET, CHEWABLE ORAL DAILY
Status: DISCONTINUED | OUTPATIENT
Start: 2021-10-27 | End: 2021-10-27 | Stop reason: HOSPADM

## 2021-10-26 RX ORDER — SODIUM CHLORIDE, SODIUM LACTATE, POTASSIUM CHLORIDE, CALCIUM CHLORIDE 600; 310; 30; 20 MG/100ML; MG/100ML; MG/100ML; MG/100ML
INJECTION, SOLUTION INTRAVENOUS CONTINUOUS PRN
Status: DISCONTINUED | OUTPATIENT
Start: 2021-10-26 | End: 2021-10-26

## 2021-10-26 RX ORDER — CEFAZOLIN SODIUM IN 0.9 % NACL 3 G/100 ML
3 INTRAVENOUS SOLUTION, PIGGYBACK (ML) INTRAVENOUS
Status: COMPLETED | OUTPATIENT
Start: 2021-10-26 | End: 2021-10-26

## 2021-10-26 RX ORDER — LABETALOL HYDROCHLORIDE 5 MG/ML
5-10 INJECTION, SOLUTION INTRAVENOUS EVERY 6 HOURS PRN
Status: DISCONTINUED | OUTPATIENT
Start: 2021-10-26 | End: 2021-10-27

## 2021-10-26 RX ORDER — EPHEDRINE SULFATE 50 MG/ML
INJECTION, SOLUTION INTRAMUSCULAR; INTRAVENOUS; SUBCUTANEOUS PRN
Status: DISCONTINUED | OUTPATIENT
Start: 2021-10-26 | End: 2021-10-26

## 2021-10-26 RX ORDER — ONDANSETRON 2 MG/ML
4 INJECTION INTRAMUSCULAR; INTRAVENOUS EVERY 6 HOURS PRN
Status: DISCONTINUED | OUTPATIENT
Start: 2021-10-26 | End: 2021-10-27 | Stop reason: HOSPADM

## 2021-10-26 RX ORDER — HYDRALAZINE HYDROCHLORIDE 20 MG/ML
5-10 INJECTION INTRAMUSCULAR; INTRAVENOUS EVERY 6 HOURS PRN
Status: DISCONTINUED | OUTPATIENT
Start: 2021-10-26 | End: 2021-10-27

## 2021-10-26 RX ORDER — HYDROMORPHONE HCL IN WATER/PF 6 MG/30 ML
.2-.4 PATIENT CONTROLLED ANALGESIA SYRINGE INTRAVENOUS EVERY 5 MIN PRN
Status: DISCONTINUED | OUTPATIENT
Start: 2021-10-26 | End: 2021-10-26 | Stop reason: HOSPADM

## 2021-10-26 RX ORDER — TESTOSTERONE GEL, 1% 10 MG/G
50 GEL TRANSDERMAL DAILY
Status: DISCONTINUED | OUTPATIENT
Start: 2021-10-27 | End: 2021-10-27 | Stop reason: HOSPADM

## 2021-10-26 RX ORDER — FENTANYL CITRATE 50 UG/ML
25 INJECTION, SOLUTION INTRAMUSCULAR; INTRAVENOUS EVERY 5 MIN PRN
Status: DISCONTINUED | OUTPATIENT
Start: 2021-10-26 | End: 2021-10-26 | Stop reason: HOSPADM

## 2021-10-26 RX ORDER — HYOSCYAMINE SULFATE 0.125 MG
125 TABLET ORAL EVERY 4 HOURS PRN
Status: DISCONTINUED | OUTPATIENT
Start: 2021-10-26 | End: 2021-10-27 | Stop reason: HOSPADM

## 2021-10-26 RX ORDER — SCOLOPAMINE TRANSDERMAL SYSTEM 1 MG/1
1 PATCH, EXTENDED RELEASE TRANSDERMAL
Status: COMPLETED | OUTPATIENT
Start: 2021-10-26 | End: 2021-10-26

## 2021-10-26 RX ORDER — KETOROLAC TROMETHAMINE 30 MG/ML
15 INJECTION, SOLUTION INTRAMUSCULAR; INTRAVENOUS
Status: DISCONTINUED | OUTPATIENT
Start: 2021-10-26 | End: 2021-10-26 | Stop reason: HOSPADM

## 2021-10-26 RX ORDER — ACETAMINOPHEN 325 MG/1
650 TABLET ORAL EVERY 4 HOURS PRN
Status: DISCONTINUED | OUTPATIENT
Start: 2021-10-26 | End: 2021-10-27 | Stop reason: HOSPADM

## 2021-10-26 RX ORDER — PROPOFOL 10 MG/ML
INJECTION, EMULSION INTRAVENOUS CONTINUOUS PRN
Status: DISCONTINUED | OUTPATIENT
Start: 2021-10-26 | End: 2021-10-26

## 2021-10-26 RX ORDER — DILTIAZEM HYDROCHLORIDE 120 MG/1
120 TABLET, FILM COATED ORAL EVERY 8 HOURS SCHEDULED
Status: DISCONTINUED | OUTPATIENT
Start: 2021-10-26 | End: 2021-10-27 | Stop reason: HOSPADM

## 2021-10-26 RX ADMIN — HYDROMORPHONE HYDROCHLORIDE 0.2 MG: 0.2 INJECTION, SOLUTION INTRAMUSCULAR; INTRAVENOUS; SUBCUTANEOUS at 23:38

## 2021-10-26 RX ADMIN — SODIUM CHLORIDE, POTASSIUM CHLORIDE, SODIUM LACTATE AND CALCIUM CHLORIDE: 600; 310; 30; 20 INJECTION, SOLUTION INTRAVENOUS at 16:00

## 2021-10-26 RX ADMIN — SODIUM CHLORIDE, POTASSIUM CHLORIDE, SODIUM LACTATE AND CALCIUM CHLORIDE: 600; 310; 30; 20 INJECTION, SOLUTION INTRAVENOUS at 15:00

## 2021-10-26 RX ADMIN — HYDROMORPHONE HYDROCHLORIDE 0.2 MG: 0.2 INJECTION, SOLUTION INTRAMUSCULAR; INTRAVENOUS; SUBCUTANEOUS at 20:54

## 2021-10-26 RX ADMIN — SCOPALAMINE 1 PATCH: 1 PATCH, EXTENDED RELEASE TRANSDERMAL at 15:04

## 2021-10-26 RX ADMIN — HYDROMORPHONE HYDROCHLORIDE 0.2 MG: 0.2 INJECTION, SOLUTION INTRAMUSCULAR; INTRAVENOUS; SUBCUTANEOUS at 17:26

## 2021-10-26 RX ADMIN — DEXMEDETOMIDINE HYDROCHLORIDE 40 MCG: 100 INJECTION, SOLUTION INTRAVENOUS at 15:26

## 2021-10-26 RX ADMIN — HYDROMORPHONE HYDROCHLORIDE 0.2 MG: 0.2 INJECTION, SOLUTION INTRAMUSCULAR; INTRAVENOUS; SUBCUTANEOUS at 17:55

## 2021-10-26 RX ADMIN — ACETAMINOPHEN 650 MG: 325 TABLET, FILM COATED ORAL at 23:38

## 2021-10-26 RX ADMIN — SUGAMMADEX 200 MG: 100 INJECTION, SOLUTION INTRAVENOUS at 16:47

## 2021-10-26 RX ADMIN — Medication 100 MG: at 15:26

## 2021-10-26 RX ADMIN — DEXMEDETOMIDINE HYDROCHLORIDE 40 MCG: 100 INJECTION, SOLUTION INTRAVENOUS at 15:16

## 2021-10-26 RX ADMIN — PHENYLEPHRINE HYDROCHLORIDE 200 MCG: 10 INJECTION INTRAVENOUS at 16:06

## 2021-10-26 RX ADMIN — PHENYLEPHRINE HYDROCHLORIDE 200 MCG: 10 INJECTION INTRAVENOUS at 15:54

## 2021-10-26 RX ADMIN — PHENYLEPHRINE HYDROCHLORIDE 100 MCG: 10 INJECTION INTRAVENOUS at 15:45

## 2021-10-26 RX ADMIN — HYDRALAZINE HYDROCHLORIDE 10 MG: 20 INJECTION INTRAMUSCULAR; INTRAVENOUS at 21:01

## 2021-10-26 RX ADMIN — DILTIAZEM HYDROCHLORIDE 120 MG: 120 TABLET, FILM COATED ORAL at 23:09

## 2021-10-26 RX ADMIN — ENOXAPARIN SODIUM 40 MG: 40 INJECTION SUBCUTANEOUS at 14:08

## 2021-10-26 RX ADMIN — Medication 3 G: at 15:36

## 2021-10-26 RX ADMIN — SODIUM CHLORIDE, POTASSIUM CHLORIDE, SODIUM LACTATE AND CALCIUM CHLORIDE: 600; 310; 30; 20 INJECTION, SOLUTION INTRAVENOUS at 18:14

## 2021-10-26 RX ADMIN — HYDROMORPHONE HYDROCHLORIDE 0.2 MG: 0.2 INJECTION, SOLUTION INTRAMUSCULAR; INTRAVENOUS; SUBCUTANEOUS at 17:31

## 2021-10-26 RX ADMIN — DEXMEDETOMIDINE 0.2 MCG/KG/HR: 100 INJECTION, SOLUTION, CONCENTRATE INTRAVENOUS at 15:39

## 2021-10-26 RX ADMIN — SODIUM CHLORIDE, POTASSIUM CHLORIDE, SODIUM LACTATE AND CALCIUM CHLORIDE: 600; 310; 30; 20 INJECTION, SOLUTION INTRAVENOUS at 19:59

## 2021-10-26 RX ADMIN — PROPOFOL 180 MCG/KG/MIN: 10 INJECTION, EMULSION INTRAVENOUS at 15:30

## 2021-10-26 RX ADMIN — MIDAZOLAM 2 MG: 1 INJECTION INTRAMUSCULAR; INTRAVENOUS at 15:26

## 2021-10-26 RX ADMIN — LIDOCAINE HYDROCHLORIDE 100 MG: 20 INJECTION, SOLUTION INFILTRATION; PERINEURAL at 15:26

## 2021-10-26 RX ADMIN — OXYCODONE HYDROCHLORIDE 10 MG: 10 TABLET ORAL at 21:26

## 2021-10-26 RX ADMIN — Medication 50 MG: at 15:26

## 2021-10-26 RX ADMIN — ROCURONIUM BROMIDE 50 MG: 50 INJECTION, SOLUTION INTRAVENOUS at 15:35

## 2021-10-26 RX ADMIN — PHENYLEPHRINE HYDROCHLORIDE 200 MCG: 10 INJECTION INTRAVENOUS at 16:00

## 2021-10-26 RX ADMIN — Medication 10 MG: at 16:06

## 2021-10-26 RX ADMIN — HYOSCYAMINE SULFATE 125 MCG: 0.12 TABLET ORAL at 20:55

## 2021-10-26 RX ADMIN — NOREPINEPHRINE BITARTRATE 12.8 MCG: 1 INJECTION, SOLUTION, CONCENTRATE INTRAVENOUS at 16:38

## 2021-10-26 RX ADMIN — PHENYLEPHRINE HYDROCHLORIDE 200 MCG: 10 INJECTION INTRAVENOUS at 15:57

## 2021-10-26 RX ADMIN — HALOPERIDOL 1 MG: 2 SOLUTION ORAL at 16:20

## 2021-10-26 RX ADMIN — OXYCODONE HYDROCHLORIDE 5 MG: 5 TABLET ORAL at 17:22

## 2021-10-26 ASSESSMENT — MIFFLIN-ST. JEOR: SCORE: 2362.13

## 2021-10-26 ASSESSMENT — ACTIVITIES OF DAILY LIVING (ADL)
ADLS_ACUITY_SCORE: 4

## 2021-10-26 ASSESSMENT — LIFESTYLE VARIABLES: TOBACCO_USE: 1

## 2021-10-26 NOTE — ANESTHESIA POSTPROCEDURE EVALUATION
Patient: Pete Amaro    Procedure: Procedure(s):  GASTRECTOMY, SLEEVE, LAPAROSCOPIC       Diagnosis:Morbid obesity (H) [E66.01]  Diagnosis Additional Information: No value filed.    Anesthesia Type:  General    Note:  Disposition: Admission   Postop Pain Control: Uneventful            Sign Out: Well controlled pain   PONV: No   Neuro/Psych: Uneventful            Sign Out: Acceptable/Baseline neuro status   Airway/Respiratory: Uneventful            Sign Out: Acceptable/Baseline resp. status   CV/Hemodynamics: Uneventful            Sign Out: Acceptable CV status; No obvious hypovolemia; No obvious fluid overload   Other NRE:    DID A NON-ROUTINE EVENT OCCUR?            Last vitals:  Vitals Value Taken Time   /87 10/26/21 1730   Temp 36.8  C (98.3  F) 10/26/21 1700   Pulse 80 10/26/21 1749   Resp     SpO2 96 % 10/26/21 1749   Vitals shown include unvalidated device data.    Electronically Signed By: Leandro Morrell MD  October 26, 2021  5:50 PM

## 2021-10-26 NOTE — ANESTHESIA PREPROCEDURE EVALUATION
Anesthesia Pre-Procedure Evaluation    Patient: Pete Amaro   MRN: [id number] : 1975        Preoperative Diagnosis: Morbid obesity (H) [E66.01]    Procedure : Procedure(s):  GASTRECTOMY, SLEEVE, LAPAROSCOPIC  possible HERNIORRHAPHY, HIATAL, LAPAROSCOPIC          Past Medical History:   Diagnosis Date     Aortic stenosis      Benign essential hypertension      Bicuspid aortic valve      High cholesterol      Morbid obesity (H)       Past Surgical History:   Procedure Laterality Date     VASECTOMY       wisdom teeth        Allergies   Allergen Reactions     Lisinopril Cough      Social History     Tobacco Use     Smoking status: Former Smoker     Types: Cigarettes     Smokeless tobacco: Never Used   Substance Use Topics     Alcohol use: Not Currently      Wt Readings from Last 1 Encounters:   10/26/21 146 kg (321 lb 14 oz)        Anesthesia Evaluation   Pt has not had prior anesthetic         ROS/MED HX  ENT/Pulmonary:     (+) HARLAN risk factors, snores loudly, hypertension, obese, tobacco use, Past use,  (-) recent URI   Neurologic:  - neg neurologic ROS     Cardiovascular: Comment: History of tachycardia, bicuspid aortic valve    (+) Dyslipidemia hypertension-----valvular problems/murmurs type: AS Previous cardiac testing   Echo: Date: 2021 Results:  Summary:     * The estimated ejection fraction is 65-70%.     * There is moderate concentric left ventricular hypertrophy.     * Normal right ventricular systolic function.     * There is mild aortic stenosis with a peak velocity of  291 cm/s, mean   gradient of  16.71 mmHg, aortic valve area of  2.07 cm2, and an NDSI of     0.64.     * The aortic valve is bicuspid.     * Doppler interrogation of the TV is inadequate to assess pulmonary   artery   systolic pressure.     * The IVC is normal in size (< 2.1 cm), > 50% respiratory variance, RA   pressure normal at 3 mmHg.     * There is no pericardial effusion visualized.     * No prior study available for  comparison.     Stress Test: Date: 3/2021 Results:  Summary     * Normal dobutamine stress echocardiogram with no inducible regional wall   motion abnormalities.     * Hyperdynamic LV systolic function estimated at 65 to 70%.     * Bicuspid aortic valve with mild calcific aortic stenosis with fusion of   the noncoronary and left coronary cusps.     * Stress EKG with no significant ST changes or arrhythmias concerning for   inducible ischemia.     ECG Reviewed: Date: Results:    Cath: Date: Results:      METS/Exercise Tolerance:     Hematologic:  - neg hematologic  ROS  (-) history of blood clots and history of blood transfusion   Musculoskeletal: Comment: Chronic low back pain      GI/Hepatic:     (+) GERD, Symptomatic, hiatal hernia,     Renal/Genitourinary:  - neg Renal ROS     Endo:     (+) Chronic steroid usage for Other. Date most recently used: daily testosterone gel. Obesity,     Psychiatric/Substance Use:  - neg psychiatric ROS  (-) alcohol abuse history   Infectious Disease:  - neg infectious disease ROS  (-) Recent Fever   Malignancy:  - neg malignancy ROS     Other:  - neg other ROS    (+) , H/O Chronic Pain,        Physical Exam    Airway        Mallampati: IV   TM distance: > 3 FB   Neck ROM: full   Mouth opening: > 3 cm    Respiratory Devices and Support         Dental       (+) chipped      Cardiovascular          Rhythm and rate: regular and normal     Pulmonary           breath sounds clear to auscultation           OUTSIDE LABS:  CBC: No results found for: WBC, HGB, HCT, PLT  BMP:   Lab Results   Component Value Date     (H) 10/26/2021     COAGS: No results found for: PTT, INR, FIBR  POC: No results found for: BGM, HCG, HCGS  HEPATIC: No results found for: ALBUMIN, PROTTOTAL, ALT, AST, GGT, ALKPHOS, BILITOTAL, BILIDIRECT, MATT  OTHER: No results found for: PH, LACT, A1C, CORWIN, PHOS, MAG, LIPASE, AMYLASE, TSH, T4, T3, CRP, SED    Anesthesia Plan    ASA Status:  3   NPO Status:  NPO  Appropriate    Anesthesia Type: General.     - Airway: ETT   Induction: RSI.   Maintenance: TIVA.   Techniques and Equipment:     - Airway: Video-Laryngoscope     - Lines/Monitors: 2nd IV, BIS     Consents    Anesthesia Plan(s) and associated risks, benefits, and realistic alternatives discussed. Questions answered and patient/representative(s) expressed understanding.     - Discussed with:  Patient         Postoperative Care    Pain management: IV analgesics, Multi-modal analgesia, Oral pain medications.   PONV prophylaxis: Ondansetron (or other 5HT-3), Dexamethasone or Solumedrol, Droperidol or Haldol, Scopolamine patch, Background Propofol Infusion (opioid sparing anesthetic)     Comments:                Margareth Cuevas MD

## 2021-10-26 NOTE — OP NOTE
[facility]    Operative Note    Pre-operative diagnosis: Morbid obesity (H) [E66.01]  HTN  Hyperlipidemia   Post-operative diagnosis Same   Procedure: Procedure(s):  GASTRECTOMY, SLEEVE, LAPAROSCOPIC   Surgeon: Surgeon(s) and Role:     * Christiano High MD - Primary   Assistant Surgeons:      Anesthesia: Joselyn Grant MD - PGY4 Resident  Neris Jo MD - PGY1 Resident  Stephanie Lyons  General    Estimated blood loss: 25 ml   Drains: None   Specimens: ID Type Source Tests Collected by Time Destination   1 : Partial Gastrectomy Tissue Stomach, Greater Curvature SURGICAL PATHOLOGY EXAM Christiano High MD 10/26/2021  4:27 PM       Findings: Sleeve gastrectomy performed without event.   Complications: None.   Implants: None.         BOUGIE SIZE: 40 F  DISTANCE FROM PYLORUS: 10 CM  STAPLE LINE REINFORCEMENT: NO  STAPLE LINE OVERSEW: NO  COMORBIDITIES:   Past Medical History:   Diagnosis Date     Aortic stenosis      Benign essential hypertension      Bicuspid aortic valve      High cholesterol      Morbid obesity (H)        INDICATIONS FOR PROCEDURE  Pete Amaro is a 46 year old male who is morbidly obese.  Numerous weight loss attempts without surgery have been without success.     After understanding the risks and benefits of proceeding with a laparoscopic vertical sleeve gastrectomy, he agreed to an operation as outlined.    I reviewed the risks of surgery with Pete Amaro.    These include, but are not limited to, death, myocardial infarction, pneumonia, urinary tract infection, deep venous thrombosis with or without pulmonary embolus, abdominal infection from bowel injury or abscess, bowel obstruction, wound infection, and bleeding.    More specific risks related to vertical sleeve gastrectomy were detailed at the bariatric informational seminar and include the followin.) leak at the vertical sleeve staple line,  "2.) stricture in the sleeve, 3.) nausea, vomiting, and dehydration for several months, 4.) adhesions causing bowel obstruction, 5.) rapid weight loss causing a higher rate of gallstone formation during the first 6 months after surgery, 6.) decreased absorption of vitamins because of the reduced stomach size, 7.) weight regain if inappropriate food intake occurs.    The BMI that we are treating this patient for was measured at the initial consultation visit in our bariatric program and it was: 51.0 kg/m^2 (from note from [facility]).    Our weight loss surgery program requires weight loss prior to bariatric surgery and currently the height, weight, and BMI are as follows:    Height: 180.3 cm (5' 11\"), Weight: 146 kg (321 lb 14 oz), and currently the Body mass index is 44.89 kg/m .    Due to the patient's comorbidity conditions of hypertension and hyperlipidemia, in association with elevated body mass index, bariatric surgery has been recommended and is being performed today.    Moreover, as the surgeon performing this procedure, I certify that the following are true in regards to this patient at or prior to the day of surgery:    1. The patient's body mass index (BMI) is or has been greater than or equal to 35 kg/m2.  2. The patient has at least one co-morbidity related to obesity (as outlined above).  3. The patient has been previously unsuccessful with medical treatment for obesity.  Please note that some of this information has been documented as part of a comprehensive pre-bariatric surgery process in the outpatient clinic and is NOT immediately available in the inpatient encounter for this bariatric operation.      OPERATIVE PROCEDURE:     Pete VIOLETA Albertclay was brought to the operating room and prepared in routine fashion. Under the benefits of general anesthesia, a left upper quadrant Veress needle was inserted and pneumoperitoneum was established using carbon dioxide gas to a maximum pressure of 15 mmHg. A " total of five ports were placed into the abdomen.     A liver retractor was placed through the rightmost port and this provided a view of the upper stomach. The operation was started by dividing the short gastric vessels off the greater curvature of the stomach. This dissection was carried up to the angle of His, and Ligasure dissector was used for hemostasis.     A bougie (40 F) was passed into the stomach and 5 blue loads of the [employer] [place] flex linear cutter stapler device were used to create a vertical sleeve gastrectomy with the bougie as a template. The bougie was removed.    A transabdominal properitoneal anesthetic block was performed using 30 ml 0.5% bupivicaine diluted with 90 ml saline (120 mL total); this was injected using 22gauge spinal needle into the properitoneal planes around the ports and laterally along the anterior axillary line under direct optical guidance. Some of the mixture was used to inject local anesthetic at the skin of each incision as well.    The sleeve gastrectomy specimen (partial gastrectomy) was now removed from the abdomen through the 15 mm port.    Hemostasis was secured, and the liver retractor and all ports were removed from the abdomen under direct visualization.     Skin incisions were closed using skin staples, and sterile dressings were placed.     I was present for all critical components of the operation and all needle and sponge counts were correct x2 at the end of the procedure.    Christiano High MD  Surgery  [phone] (hospital )        Operative report initially prepared by:  Joselyn Grant MD  General Surgery PGY-4

## 2021-10-26 NOTE — ANESTHESIA PROCEDURE NOTES
Airway       Patient location during procedure: OR       Procedure Start/Stop Times: 10/26/2021 3:29 PM  Staff -        Anesthesiologist:  Tatiana Martin MD       Resident/Fellow: Seng Heart MD       Performed By: resident  Consent for Airway        Urgency: elective  Indications and Patient Condition       Indications for airway management: ozzie-procedural       Induction type:RSI       Mask difficulty assessment: 0 - not attempted    Final Airway Details       Final airway type: endotracheal airway       Successful airway: ETT - single  Endotracheal Airway Details        ETT size (mm): 7.5       Cuffed: yes       Successful intubation technique: video laryngoscopy       VL Blade Size: MAC 4       Grade View of Cords: 1       Position: Right       Measured from: lips       Secured at (cm): 23       Bite block used: None    Post intubation assessment        Placement verified by: capnometry, equal breath sounds and chest rise        Number of attempts at approach: 1       Secured with: pink tape and silk tape       Ease of procedure: easy       Dentition: Intact and Unchanged

## 2021-10-26 NOTE — PROGRESS NOTES
"Post Op Check    10/27/2021    Pete Amaro is a 46 year old male with h/o morbid obesity now POD#0 s/p laparoscopic sleeve gastrectomy.    Pt reports pain is manageable. Denies SOB, chest pain, or dizziness. No BM. Voiding 2x. Ambulated.    BP (!) 153/84 (BP Location: Left arm)   Pulse 104   Temp 99.4  F (37.4  C) (Axillary)   Resp 12   Ht 1.803 m (5' 11\")   Wt 146 kg (321 lb 14 oz)   SpO2 96%   BMI 44.89 kg/m      Gen: A&O x3, NAD  Chest: breathing non-labored on 1L  Abdomen: soft, non-tender, non-distended  Incision: clean, dry, intact covered with dressing  Extremities: warm and well perfused    A/P: No acute post-op issues. Continue plan of care per primary team. Please call with any questions.    Dung Herrera  General Surgery PGY1   Night float      "

## 2021-10-26 NOTE — ANESTHESIA CARE TRANSFER NOTE
Patient: Pete Amaro    Procedure: Procedure(s):  GASTRECTOMY, SLEEVE, LAPAROSCOPIC       Diagnosis: Morbid obesity (H) [E66.01]  Diagnosis Additional Information: No value filed.    Anesthesia Type:   General     Note:    Oropharynx: spontaneously breathing  Level of Consciousness: drowsy  Oxygen Supplementation: face mask  Level of Supplemental Oxygen (L/min / FiO2): 10  Independent Airway: airway patency satisfactory and stable  Dentition: dentition unchanged  Vital Signs Stable: post-procedure vital signs reviewed and stable    Patient transferred to: PACU    Handoff Report: Identifed the Patient, Identified the Reponsible Provider, Reviewed the pertinent medical history, Discussed the surgical course, Reviewed Intra-OP anesthesia mangement and issues during anesthesia, Set expectations for post-procedure period and Allowed opportunity for questions and acknowledgement of understanding      Vitals:  Vitals Value Taken Time   /73 10/26/21 1700   Temp 98.0    Pulse 74 10/26/21 1704   Resp 16    SpO2 100 % 10/26/21 1704   Vitals shown include unvalidated device data.    Electronically Signed By: ZEINAB Choi CRNA  October 26, 2021  5:04 PM

## 2021-10-27 VITALS
SYSTOLIC BLOOD PRESSURE: 140 MMHG | HEIGHT: 71 IN | TEMPERATURE: 97.9 F | WEIGHT: 315 LBS | DIASTOLIC BLOOD PRESSURE: 79 MMHG | OXYGEN SATURATION: 94 % | RESPIRATION RATE: 20 BRPM | HEART RATE: 96 BPM | BODY MASS INDEX: 44.1 KG/M2

## 2021-10-27 LAB
CREAT SERPL-MCNC: 0.65 MG/DL (ref 0.66–1.25)
GFR SERPL CREATININE-BSD FRML MDRD: >90 ML/MIN/1.73M2
GLUCOSE BLDC GLUCOMTR-MCNC: 98 MG/DL (ref 70–99)
PLATELET # BLD AUTO: 230 10E3/UL (ref 150–450)

## 2021-10-27 PROCEDURE — 250N000011 HC RX IP 250 OP 636: Performed by: SURGERY

## 2021-10-27 PROCEDURE — 36415 COLL VENOUS BLD VENIPUNCTURE: CPT | Performed by: SURGERY

## 2021-10-27 PROCEDURE — 82565 ASSAY OF CREATININE: CPT | Performed by: SURGERY

## 2021-10-27 PROCEDURE — 250N000013 HC RX MED GY IP 250 OP 250 PS 637: Performed by: SURGERY

## 2021-10-27 PROCEDURE — 85049 AUTOMATED PLATELET COUNT: CPT | Performed by: SURGERY

## 2021-10-27 PROCEDURE — 999N000157 HC STATISTIC RCP TIME EA 10 MIN

## 2021-10-27 RX ORDER — LOSARTAN POTASSIUM AND HYDROCHLOROTHIAZIDE 25; 100 MG/1; MG/1
1 TABLET ORAL DAILY
Status: DISCONTINUED | OUTPATIENT
Start: 2021-10-27 | End: 2021-10-27 | Stop reason: HOSPADM

## 2021-10-27 RX ORDER — ACETAMINOPHEN 325 MG/1
650 TABLET ORAL EVERY 4 HOURS PRN
COMMUNITY
Start: 2021-10-27

## 2021-10-27 RX ORDER — DILTIAZEM HYDROCHLORIDE 120 MG/1
120 TABLET, FILM COATED ORAL EVERY 8 HOURS
Qty: 90 TABLET | Refills: 1 | Status: SHIPPED | OUTPATIENT
Start: 2021-10-27 | End: 2021-11-30

## 2021-10-27 RX ORDER — OXYCODONE HYDROCHLORIDE 5 MG/1
5-10 TABLET ORAL EVERY 4 HOURS PRN
Qty: 20 TABLET | Refills: 0 | Status: SHIPPED | OUTPATIENT
Start: 2021-10-27 | End: 2021-11-30

## 2021-10-27 RX ADMIN — HYOSCYAMINE SULFATE 125 MCG: 0.12 TABLET ORAL at 08:55

## 2021-10-27 RX ADMIN — OXYCODONE HYDROCHLORIDE 10 MG: 10 TABLET ORAL at 02:27

## 2021-10-27 RX ADMIN — OXYCODONE HYDROCHLORIDE 10 MG: 10 TABLET ORAL at 17:44

## 2021-10-27 RX ADMIN — OMEPRAZOLE 20 MG: 20 CAPSULE, DELAYED RELEASE ORAL at 11:00

## 2021-10-27 RX ADMIN — OXYCODONE HYDROCHLORIDE 10 MG: 10 TABLET ORAL at 10:58

## 2021-10-27 RX ADMIN — ASPIRIN 81 MG CHEWABLE TABLET 81 MG: 81 TABLET CHEWABLE at 08:58

## 2021-10-27 RX ADMIN — OXYCODONE HYDROCHLORIDE 10 MG: 10 TABLET ORAL at 06:30

## 2021-10-27 RX ADMIN — LOSARTAN POTASSIUM AND HYDROCHLOROTHIAZIDE 1 TABLET: 25; 100 TABLET ORAL at 08:59

## 2021-10-27 RX ADMIN — HYOSCYAMINE SULFATE 125 MCG: 0.12 TABLET ORAL at 03:46

## 2021-10-27 RX ADMIN — ENOXAPARIN SODIUM 40 MG: 40 INJECTION SUBCUTANEOUS at 11:01

## 2021-10-27 RX ADMIN — TESTOSTERONE GEL, 1% 50 MG OF TESTOSTERONE: 10 GEL TRANSDERMAL at 11:03

## 2021-10-27 RX ADMIN — DOCUSATE SODIUM 50 MG AND SENNOSIDES 8.6 MG 2 TABLET: 8.6; 5 TABLET, FILM COATED ORAL at 11:00

## 2021-10-27 RX ADMIN — DILTIAZEM HYDROCHLORIDE 120 MG: 120 TABLET, FILM COATED ORAL at 08:58

## 2021-10-27 RX ADMIN — OXYCODONE HYDROCHLORIDE 10 MG: 10 TABLET ORAL at 14:27

## 2021-10-27 RX ADMIN — DILTIAZEM HYDROCHLORIDE 120 MG: 120 TABLET, FILM COATED ORAL at 16:08

## 2021-10-27 ASSESSMENT — ACTIVITIES OF DAILY LIVING (ADL)
ADLS_ACUITY_SCORE: 4

## 2021-10-27 NOTE — DISCHARGE SUMMARY
"[facility] Discharge Summary    Date of Admission: 10/26/2021  Date of Discharge: 10/27/2021     Admission Diagnosis:  1. Hypertension  2. Hyperlipidemia  3. Morbid obesity    Discharge Diagnosis:  Same as above  1. S/p Laparoscopic sleeve gastrectomy    Consultations:  Pharmacy    Procedures:  1. Laparoscopic sleeve gastrectomy by Dr. High on 10/26/2021    Brief HPI:  Pete Amaro is a 46 year old male with a history of morbid obesity and associated co-morbidities of hypertension and hyperlipidemia. Pre-operatively, his BMI was 51.0 kg/m^2. Numerous weight loss attempts without surgery have been without success. He underwent extensive pre-operative screening and education and was found to be an appropriate candidate for weight loss surgery. After a discussion of the risks, benefits, and alternatives, the patient elected to proceed with surgery.     Hospital Course:  The patient was admitted and underwent the above procedure. He tolerated the procedure well. There were no complications. The patient's diet was slowly advanced. Pain was controlled with oral pain medication and the patient was able to ambulate and void without difficulty. He received appropriate education post operatively. On POD#1 the patient was discharged to home.    Discharge Physical Exam:  BP (!) 158/85 (BP Location: Left arm)   Pulse 101   Temp 98.8  F (37.1  C) (Oral)   Resp 21   Ht 1.803 m (5' 11\")   Wt 146 kg (321 lb 14 oz)   SpO2 94%   BMI 44.89 kg/m       General: AAOx4, NAD, lying comfortably in bed  CV: regular rate and rhythm, warm, well-perfused  Pulm: no dyspnea, breathing comfortably on NC  Abd: soft, non-distended, appropriately minimally tender to palpation, incisions c/d/i with staples no signs of infection  Extremities: no edema  Neuro: moving all extremities spontaneously without apparent deficit    Meds:     Review of your medicines      START taking      Dose / " Directions   acetaminophen 325 MG tablet  Commonly known as: TYLENOL      Dose: 650 mg  Take 2 tablets (650 mg) by mouth every 4 hours as needed for other (For optimal non-opioid multimodal pain management to improve pain control and physical function.)  Refills: 0     diltiazem 120 MG tablet  Commonly known as: CARDIZEM      Dose: 120 mg  Take 1 tablet (120 mg) by mouth every 8 hours  Quantity: 90 tablet  Refills: 1     oxyCODONE 5 MG tablet  Commonly known as: ROXICODONE      Dose: 5-10 mg  Take 1-2 tablets (5-10 mg) by mouth every 4 hours as needed for pain  Quantity: 20 tablet  Refills: 0        CONTINUE these medicines which have NOT CHANGED      Dose / Directions   aspirin 81 MG EC tablet  Commonly known as: ASA      Dose: 81 mg  Take 81 mg by mouth daily  Refills: 0     hyoscyamine 0.125 MG tablet  Commonly known as: LEVSIN  Used for: Morbid obesity (H)      Dose: 125 mcg  Take 1 tablet (125 mcg) by mouth every 4 hours as needed for cramping  Quantity: 30 tablet  Refills: 1     losartan-hydrochlorothiazide 100-25 MG tablet  Commonly known as: HYZAAR      Dose: 1 tablet  Take 1 tablet by mouth daily  Refills: 0     modafinil 200 MG tablet  Commonly known as: PROVIGIL      Dose: 200 mg  Take 200 mg by mouth daily as needed  Refills: 0     naltrexone 50 MG tablet  Commonly known as: DEPADE/REVIA      Dose: 25 mg  Take 25 mg by mouth 2 times daily  Refills: 0     omeprazole 20 MG DR capsule  Commonly known as: priLOSEC  Used for: Morbid obesity (H)      Dose: 20 mg  Take 1 capsule (20 mg) by mouth daily  Quantity: 30 capsule  Refills: 3     ondansetron 4 MG ODT tab  Commonly known as: ZOFRAN-ODT  Used for: Morbid obesity (H)      Dose: 4 mg  Take 1 tablet (4 mg) by mouth every 8 hours as needed for nausea  Quantity: 15 tablet  Refills: 0     senna-docusate 8.6-50 MG tablet  Commonly known as: SENOKOT-S/PERICOLACE  Used for: Morbid obesity (H)      Dose: 2 tablet  Take 2 tablets by mouth daily as needed for  constipation (While taking narcotic pain medications.  Stop taking if having loose stools.)  Quantity: 30 tablet  Refills: 1     testosterone 50 MG/5GM (1%) topical gel  Commonly known as: ANDROGEL/TESTIM      Place onto the skin daily  Refills: 0        STOP taking    diltiazem  MG 24 hr ER beaded capsule  Commonly known as: TIAZAC        pravastatin 20 MG tablet  Commonly known as: PRAVACHOL              Where to get your medicines      These medications were sent to [facility] - [place], [place] - [address]  [address] [address]    Phone: [phone]     diltiazem 120 MG tablet     Some of these will need a paper prescription and others can be bought over the counter. Ask your nurse if you have questions.    Bring a paper prescription for each of these medications    oxyCODONE 5 MG tablet  You don't need a prescription for these medications    acetaminophen 325 MG tablet         Additional instructions:     Reason for your hospital stay    You had laparoscopic sleeve surgery.     Follow Up and recommended labs and tests    See discharge instructions.     Activity    See discharge instructions.     Discharge Instructions    After Bariatric Surgery Discharge Instructions  [facility]     Note: Ask your nurse to order your medications from the pharmacy. Be sure you have your medications with you when you leave.    **Your diltiazem medication has changed to the quick release kind that you take 3 times daily because the pill size is smaller**    Diet on discharge:  - Bariatric full liquid diet until 1 week follow up appt  How much fluid should I drink?  Strive for 48-64 ounces daily.  Carry a water bottle with you without a straw or sports top. Drink from it often.  Keep track of how much fluid you drink in a day.  Remember:  -Do not use straws, chew gum or suck on hard candies. They may cause painful gas.    -Sip, don't slurp  when you drink.    -Practice small sips using a medicine cup for the first week postop.   - No ice or cold drinks. This could cause gas or spasms.   - No coffee, soda pop or drinks with caffeine. These may cause stomach pain.   - No alcohol. It is bad for your liver and will cause stomach pain.    How often should I do my deep breathing and coughing?  Use your incentive spirometer (small plastic breathing device) every hour while awake after you get home. Using the incentive spirometer helps you deep breath. Continuing to cough and deep breath will help prevent fevers and pneumonia.   If you do not have a fever after one week, you can stop using the incentive spirometer and discard it.     You can continue to take deep breaths without the incentive spirometer every one to two hours while awake for the month after surgery.    What kinds of activity can I do?  Get plenty of rest the first few days after surgery and try to balance rest and activity. You will need some time to recover - you may be more tired than you realize at first. You'll feel better and heal faster if you take good care of yourself.  For 4 weeks after surgery (Please review restrictions at your one week visit, they could change based on how well you are doing):  - Don't lift more than 20 pounds.   - Take 4-5 short walks every day.  - Don't jog, run, or do belly exercises.  Don't swim, bathe or use a hot tub until your cuts are healed (scabs are gone).  You may shower  Don't plan to fly or take a road trip within the first 1 to 3 months after surgery.  You could get a blood clot in your legs. If you must travel, get up and move around every hour for at least 5 minutes before continuing on your journey.  Your care team can help you decide when it's safe for you to travel.     What can I do for pain control?  You had major belly surgery that involves all layers of your belly muscles. Pain is expected, even for some as far out as 6-8 weeks after  surgery. Moving, sneezing, coughing, and breathing will cause discomfort because these activities use your belly muscles.   Please see your after visit summary medication review for what pain medication will be continued, discontinued and newly started for you.    You can take opioid pain medicine if prescribed and if needed. Try to wean off from it as soon as you feel comfortable.   Do not drive while you are taking opioid pain medicine. This is dangerous.  You can take acetaminophen (Tylenol) between your prescribed pain medicine on a scheduled basis OR take it scheduled every 6 hours.     - Acetaminophen formulation options:    - Liquid               - Caplet (Cut caplet in half before taking)      - Do not take more than 3000 mg Tylenol in a 24 hour period.     - You may also take Tylenol for pain in place of the opioid pain medicine (check with your care team for specifics).   You can apply ice or heat to the affected area(s). Just remember to wrap the ice in something and limit icing sessions to 20 minutes. Excessive icing can irritate the skin or cause skin damage.  You can apply heat with a hot, wet towel or heating pad. Just like cold therapy, limit heat application to 20 minutes. Never sleep with a heating pad on. It could cause severe burns to your skin.  Wear your binder to support your belly muscles if you have one.  Take this off a little more each day and try to be off completely by 2 weeks after surgery. If you don't need your binder for comfort or support, you don't need to wear it.   You may not be able to sleep in a comfortable position for a few weeks after surgery. This is normal. You may be more comfortable sleeping in a recliner or propped up with 3 pillows for the first couple of weeks after surgery.  Do not take NSAID's (Non-Steroidal Anti-Inflammatory Drugs) (examples: ibuprofen, Motrin, Advil, Aleve, and Naproxen), aspirin, or use pain patches with NSAID's. They will increase your risk of  bleeding or getting an ulcer.    Please call the clinic for any of the following pain concerns, we would like to talk to you:  - pain that does not improve with rest  - pain that gets worse and worse  - pain that is not controlled by your pain medicine  - a sudden severe increase in pain    What medications will I need to take after surgery?  You may be discharged with the following types of medications: omeprazole 20 mg once daily for heartburn symptom prevention, zofran as needed for nausea and a medication called hyoscyamine (levsin) as needed for cramping. Please see your after visit summary medication review for what will be continued, discontinued and newly started.  It is important to reduce the amount of acid in your new stomach for a couple of months after surgery while it is still healing. We will prescribe an acid reducer or antacid. Take it as directed. This will help prevent ulcers, heartburn and acid reflux.  If you took an acid reducer before you had surgery, your care team will let you know what acid reducer you will take after surgery.   It is okay to swallow any medications smaller than   inch in size.   - If it is larger than   inch, it may need to be cut, crushed, or in a liquid form. Check with your care team about which way is most appropriate for you and your medications.    What should I know about my incisions (cuts)?  Your incisions are covered with white steri strips or butterfly tape and have band aids or gauze over the top. If you have gauze or band aids, they can come off in the hospital.  Leave your steri strips on until they fall off on their own. If the steri strips don't fall off after 1 to 2 weeks, you can take them off. If they fall off earlier, replace them with clean band aids trying to avoid touching the incision itself.   If you have gauze covered by a clear dressing, remove 2-3 days after surgery or as directed by your care team.  You may shower in the hospital after surgery  and can get your incision coverings wet.  Do not submerge in water (e.g. No baths, swimming pools, hot tubs) until your care team tells you it is okay and your incisions are completely healed.    Call the clinic if you have any of these signs or symptoms of infection:  - Redness around the site.  - Drainage that smells bad.  - Drainage that is thick yellow or green.  - An increase in pain around the incision site  - An increase in swelling around the incision site  - Heat or warmth around incision site   - Fever of 101.5  F (38.3  C) or higher when taken under the tongue.   - Chills    Will my urine or bowel movements change?   Your first bowel movements (stools) will likely be liquid. You may also notice old blood or a darker color (black or maroon color) in your bowel movements.  This is not unusual and usually goes away after the first week, if not sooner. You may not have a bowel movement for a week.   If you have not had a bowel movement for at least three days after your surgery date and are passing gas, you can use over the counter stool softeners.  Please stop taking the stool softeners and laxatives if your stools are loose.  Increasing fluids and activity as well as getting off narcotics will help prevent constipation.    Call the clinic if:   - You have stomach pain.   - You continue to have constipation.  - You have excessive bloating after walking and passing gas.    How can I prevent dehydration if I feel nauseated (sick to my stomach) and vomit (throw up)?   Vomiting is not normal after surgery. If you continue to have nausea and vomiting, call the clinic.   Nausea can be a sign of dehydration. That is why it is very important to track your fluids.  Do not nap more than one hour during the day. Set a timer to wake yourself up, if needed. Too much sleep will keep you from drinking enough fluid during the day and lead to dehydration.  No outside activity in hot, humid weather until you can drink 48 to  "64 ounces of fluid in 24 hours. If you sweat a lot, your body may lose too much water.  Try to take a 1 ounce sip of water (one medicine cup) every 15 minutes.  Set a timer to remind yourself.    Call the clinic if you have any of these signs or symptoms of dehydration:  - Dark colored urine  - Urinating (pass water) less than 2-3 times per day  - Lack of energy  - Nausea  - Dizziness  - Headache    Call the clinic ANY TIME at [phone] if:  - Your pain medicine is not working.  - You have a fever = 101.5 F.  - You have belly or left shoulder pain that gets worse and worse.  - You have a swollen leg with redness, warmth, or pain behind the knee or calf.  - You have chest pain   - You feel very short of breath.  - You have a sudden severe increase in heart rate.  - You have vomiting that gets worse and worse.  - You have constant nausea (feeling sick to your stomach) that does not go away with medication.  - You have trouble swallowing.  - You have an increasing feeling that \"something is not right\".  - You have hiccups that do not stop.  - You have any questions or concerns.    AFTER HOURS QUESTIONS OR CONCERNS: Call [phone] and ask to speak with surgery resident if you are having troubles in the evenings, at night, or on weekends. Please call if you experience increasing abdominal pain, nausea, vomiting, increasing drainage from your wounds, chills, or fever >101.5    If you have to go to the Emergency Room, we prefer you go to the hospital that did your surgery. Please let them know that you had bariatric surgery and to notify your surgeon.    When should I go back to the clinic?  Follow up with your care team in 1-2 weeks.   If this appointment was not already made, please call: [phone]    Appointments located at [facility] clinic:  [facility] ([facility]  [address]  [place] [address]     Diet    See discharge instructions.       Discussed with Dr. High.  - - " - - - - - - - - - - - - - - - -  Joselyn Grant MD  General Surgery PGY-4  See [facility] for on call information.

## 2021-10-27 NOTE — PLAN OF CARE
"[id number]    BP (!) 146/73 (BP Location: Left arm)   Pulse 90   Temp 98.2  F (36.8  C) (Oral)   Resp 24   Ht 1.803 m (5' 11\")   Wt 146 kg (321 lb 14 oz)   SpO2 93%   BMI 44.89 kg/m        Activity: up with standby assist   Neuros: alert and orientated x 4, calm and cooperative   Cardiac: WNL  Respiratory: O2 sats 93% on 1 L NC   GI/: abdomen soft/tender, no gas/no BM, voiding spont (adequate amounts)  Diet: bariatric clears   Lines: PIV   Incisions/Drains: abdominal lap dressings CDI, no drains    Labs: reviewed  Pain/nausea: prn oxycodone/IV dilaudid with \"adequate\" pain control, no nausea    New changes this shift: none    Plan: likely discharge to home today, continue POC     "

## 2021-10-27 NOTE — PHARMACY-CONSULT NOTE
Bariatric Consult    Medications evaluated as requested per Bariatric Consult. Medications available as liquid formulations have been dispensed when possible.    The following changes have been made based on the Bariatric Medication Management Policy. diltiazem ER 360mg daily ->diltiazem IR 120mg q8hr    The pharmacist will continue to follow as new medications are ordered.

## 2021-10-27 NOTE — PLAN OF CARE
"BP (!) 140/79 (BP Location: Left arm)   Pulse 96   Temp 97.9  F (36.6  C) (Oral)   Resp 20   Ht 1.803 m (5' 11\")   Wt 146 kg (321 lb 14 oz)   SpO2 94%   BMI 44.89 kg/m      Neuro: A&O x4  Respiratory: sats >93% on RA, denies SOB. IS encouraged.   Cardiac: HTN w/in parameters. Denies cardiac chest pain/symtpoms.   GI/: Voiding spontaneously w/out difficulty. +flatus, +BS, no BM.   Diet: bariatric fulls, tolerating.   Pain/Nausea: oxycodone given for pain. Denies nausea.  Incisions/Drains: Lap sites x5, staples removed, steri strips CDI.   IV Access: piv removed.   Labs: reviewed  Activity: up ad tg, ambulating halls.   Plan: discharged home.     Discharge paperwork was reviewed with patient. Pt expressed understanding of discharge care instructions. A copy was given to patient. Pt discharging home. Discharge medications and tablet splitter picked up prior to discharge. Incentive spirometer given to patient. PIV removed per nursing assistant. Pt refused transport services, ambulated to lobby independently. All patient belongings were taken with patient.       "

## 2021-10-27 NOTE — PROGRESS NOTES
"BARIATRIC SURGERY PROGRESS NOTE  10/27/2021    Patient: Pete Amaro  MRN: [id number]    Subjective/Interval Events  No acute overnight events. Tachycardic and hypertensive overnight but now better. Doing well, minimal nausea, tolerating bariatric clears. Has been ambulating and voiding well. Pain controlled. Looking forward to discharge, but still on 1L NC.    Objective  Temp:  [98.2  F (36.8  C)-99.9  F (37.7  C)] 98.8  F (37.1  C)  Pulse:  [] 101  Resp:  [12-26] 21  BP: (127-199)/() 158/85  SpO2:  [92 %-99 %] 95 %    General: AAOx4, NAD, lying comfortably in bed  CV: regular rate and rhythm, warm, well-perfused  Pulm: no dyspnea, breathing comfortably on NC  Abd: soft, non-distended, appropriately minimally tender to palpation, incisions c/d/i with staples no signs of infection  Extremities: no edema  Neuro: moving all extremities spontaneously without apparent deficit    I/O last 3 completed shifts:  In: 4540 [P.O.:240; I.V.:4300]  Out: 1670 [Urine:1650; Blood:20]    Labs:  None    Imaging:  None    Assessment & Plan  Pete Amaro is a 46 year old man with a h/o aortic stenosis, HTN, HLD, ?HARLAN (uses home \"dental appliance\"), and morbid obesity who is s/p laparoscopic sleeve gastrectomy 10/26/2021. Doing well.    Neuro:      - Pain: Tylenol PRN, oxy PRN, dilaudid IV PRN, hycosamine PRN for cramping  CV: HDS, no issues, monitor for tachycardia as first sign of leak     - Ok for PTA ASA 81mg     - Restart PTA losartan-hydrochlorothiazide   Pulm: satting on NC, encourage IS  FEN/GI:      - IVF: discontinue IVFs     - Diet: advance to bariatric full liquids     - Bowel regimen: senna-colace     - Nausea control: Zofran/compazine PRN     - No pills >6mm, appreciate Pharmacy assistance  Renal: Voiding spontaneously, monitor UOP with strict I&Os  Heme: no s/sx bleeding, no need for labs unless concern  ID: afebrile, no postop abx indicated  Endo: no h/o DM, no issues  PPx: encourage ambulation " LOGAN, IS, SCDs, Lovenox starting POD#1 AM  Dispo: 7B inpatient status, discharge to home anticipated later today    Will discuss with Dr. High.   - - - - - - - - - - - - - - - - - -  Joselyn Grant MD  General Surgery PGY-4  See [facility] for on call information.

## 2021-10-28 ENCOUNTER — PATIENT OUTREACH (OUTPATIENT)
Dept: ENDOCRINOLOGY | Facility: CLINIC | Age: 46
End: 2021-10-28

## 2021-10-28 LAB
PATH REPORT.COMMENTS IMP SPEC: NORMAL
PATH REPORT.COMMENTS IMP SPEC: NORMAL
PATH REPORT.FINAL DX SPEC: NORMAL
PATH REPORT.GROSS SPEC: NORMAL
PATH REPORT.MICROSCOPIC SPEC OTHER STN: NORMAL
PATH REPORT.RELEVANT HX SPEC: NORMAL
PHOTO IMAGE: NORMAL

## 2021-10-28 NOTE — PROGRESS NOTES
RN Post-Op/Post-Discharge Care Coordination Note    Mr. Pete Amaro is a 46 year old male who underwent laparoscopic gastric sleeve on 10/26/21 with  Dr. Florentin High.  Spoke with Patient.    Support  Patient able to care for self independently     Health Status  Fevers/chills: Patient denies any fever or chills.    Nausea/Vomiting: Patient denies nausea/vomiting.    Eating/drinking: Tolerating full liquid diet, Discussed importance of adequate hydration (48-64 ounces or greater of water) and protein intake.  Recommended keeping a food/fluid diary. and Discussed drinking slowly with small sips. Reminded to drink small sips slowly    Fluid Ounces per day: 40 ounces yesterday after he got home.    Bowel habits: Patient reports no bowel movement since surgery. and passing gas.    Activity:standing, sitting, walking and ambulating stairs    Breathing: Reminded patient to use incentive spirometer- 5 to 10 deep breaths each hour while awake.    Other symptoms: Tachycardia: no, Dizziness/lightheadedness: no, Shortness of breath, dyspnea with exertion, leg pain/swelling: no.      Drains (MARGARITO): N/A    Incisions: Patient denies any signs and symptoms of infection..  Wound closure:  Steri-strips    Pain: Rates pain a 4 on a 10 Scale.  IAlternating Narcotic Analgesic with Extra Strength Tylenol.  Encouraged non-medication management modalities: Heating pad, with barrier, to abdomen.    Encouraged splinting and positioning.  Encouraged patient to try ES Tylenol during the day and only use the pain medication at night.    New Medications:  Omeprazole (opening capsules), Levsin, Zofran, Oxycodone, Tylenol, Senna, home medications and patient was reminded not to take NSAIDS    Activity/Restrictions  No lifting in excess of 15 pounds for 4 weeks    Pathology reviewed with patient:  No, not yet available    Forms/Letters  No. All forms should be faxed to [phone].  Patient is planning on being off work for 4  weeks    Additional Questions: All of his questions were answered including reviewing diet, restrictions, and wound care.  He will call this office if he has any further questions and/or concerns.      Post op appointment on November 02 at 7:00 AM confirmed with the patient:  Yes.    Whom and When to Call  Nurses: [phone]  Fax: [phone]     Patient advised if any concerns outside of clinic hours, to call hospital at [phone] and ask to speak to on-call bariatric resident. They should present to ED at [facility] to be assessed if urgency arises.      Risk for:  urinary tract infection, pneumonia, leg clots (deep vein thrombosis), lung clots (pulmonary emboli), injury to the bowels or other organs, bowel obstruction, hernia, and gastrointestinal bleeding.      Weight loss surgery side effects:  abdominal pain, cramping, bloating, difficulty swallowing, constipation, nausea, vomiting, diarrhea, frequent loose stools, dehydration, hair loss, excess skin, protein, iron and vitamin deficiencies, malnutrition, fatigue, and heartburn.    Bariatric surgery risks may include:  an internal leak at the staple line, narrowing of the esophagus, stomach or intestines (strictures), gallstones, bowel obstruction, inflammation of the esophagus or stomach, ulcers with or without bleeding, injuries to other organs, hernias, and iron deficiency.    Sleeve gastrectomy side effects:  leaks are more common after this procedure.  Risks include:  internal leak at the vertical sleeve staple line; nausea, vomiting, and dehydration for several months; bowel obstruction; gallstones during the first 6 months related to rapid weight loss; decreased absorption of vitamins and protein because of the reduced stomach size; weight regain if you increase food intake; narrowing of stomach, esophagus or intestines (stricture); injury to other organs; hernia; and ulcers.

## 2021-11-01 ENCOUNTER — DOCUMENTATION ONLY (OUTPATIENT)
Dept: OTHER | Facility: CLINIC | Age: 46
End: 2021-11-01

## 2021-11-01 NOTE — PROGRESS NOTES
"Pete Amaro is a 46 year old male who is being evaluated via a billable video visit.      The patient has been notified of following:     \"This video visit will be conducted via a call between you and your physician/provider. We have found that certain health care needs can be provided without the need for an in-person physical exam.  This service lets us provide the care you need with a video conversation.  If a prescription is necessary we can send it directly to your pharmacy.  If lab work is needed we can place an order for that and you can then stop by our lab to have the test done at a later time.    Video visits are billed at different rates depending on your insurance coverage.  Please reach out to your insurance provider with any questions.    If during the course of the call the physician/provider feels a video visit is not appropriate, you will not be charged for this service.\"    How would you like to obtain your AVS? MyChart  If the video visit is dropped, the invitation should be resent by: Text to cell phone: [phone]  Will anyone else be joining your video visit? No      Video-Visit Details    Type of service:  Video Visit    Video Start Time: 8:30 AM   Video End Time: 8:47 AM    Originating Location (pt. Location): Home    Distant Location (provider location):  [facility] [place]     Platform used for Video Visit: BrandCont    During this virtual visit the patient is located in [place], patient verifies this as the location during the entirety of this visit.       Nutrition Assessment  Reason For Visit:  Pete Amaro is a 46 year old male presenting today for nutrition follow-up, 1 week s/p laparoscopic sleeve gastrectomy with Dr. High 10/26/21.    Patient referred by Dr. High on 10/26/21 and Татьяна Smith on 11/2/21.    Anthropometrics  Initial Consult Weight: 366 lbs per chart review   Day of Surgery Weight 10/26/21: 321 lbs    Estimated body mass " "index is 42.05 kg/m  as calculated from the following:    Height as of an earlier encounter on 11/2/21: 1.803 m (5' 11\").    Weight as of an earlier encounter on 11/2/21: 136.8 kg (301 lb 8 oz).    Current Weight: 301 lbs    Current Vitamins/Minerals:   None currently    He had bought a sloan liquid force MVI with iron off [employer]. It is a capsule. He has not started it yet. He plans to get Boncarbo with iron chewable pills instead.    Order placed today by Татьяна for B12 injection.  Ca chew order placed by me.     Nutrition History  Pt reports consuming and tolerating bariatric clear and low-fat full liquid diets. Fluid intake appears adequate, consuming 48-64 oz/day.    Things going well. No concerns per pt.  Reviewed diet protocol. Pt is leaving for [place] [employer] today and will be there for 3 weeks.   Also reviewed supplement requirements. Pt plans to go to pharmacy today.    His wife made him 4-8 oz pureed meals. Soup with veggies/hamburger, enchiladas and meat loaf with mashed potatoes.     Nutrition Prescription:  Grams Protein: 60 (minimum)  Amount of Fluid: 48-64 oz      Nutrition Diagnosis  Food and nutrition-related knowledge deficit r/t lack of prior exposure to diet advancements beyond bariatric low-fat full liquid diet aeb pt unable to verbalize understanding of bariatric pureed and soft diets.    Intervention  Intervention At Appointment:  Materials/education provided on bariatric pureed and soft diets, protein intake, fluid intake, eating pace, portion control, avoiding excess sugar and fat, recommended vitamin/mineral supplements. Patient demonstrates understanding.     Expected Engagement: good    Goals:  1) Follow diet advancement schedule (noted below)  2) Work towards 60+ gm protein/day.  3) Consume 48-64 oz fluids daily- between meals only once on puree diet  4) Eat slowly (>20 min/meal), chewing well to smooth consistency once on the bariatric soft diet.  5) Limit portions to 1/4 to 1/2 " cup/meal.  6) Start chewable/liquid multivitamin/minerals twice daily.    Take the following after a Sleeve Gastrectomy:  - Multivitamin/minerals: adult dose 1-2 times daily  Ideally want one that provides:   5,000 - 10,000 international units vitamin A daily   800 mg oral folate daily  8 - 22 mg zinc and 1 - 2 mg copper daily  - Iron: 45-60 mg elemental (18-36 mg if low risk) - may partly or fully be covered in multivitamin   - Calcium Citrate containing vitamin D: 500 mg 3 times daily or 600 mg 2 times daily     - Separate the calcium from your multivitmain or iron by at least 2 hours.     - Must be a chewable calcium citrate until post-op 3 months     - Options for calcium citrate: Ace calcium citrate chewable, bariatric advantage calcium citrate chewable, Celebrate vitamins calcium citrate chewable, Bariatric Fusion calcium citrate chewable  - Vitamin D - at least 3,000 international units/day between all supplements  - Vitamin B12:  injection of 1000 mcg monthly       Post-op Diet Advancement Schedule:  Low-Fat Full Liquid Diet (stage 2): 10/27 -11/9  Pureed Diet (stage 3): 11/10 - 11/23  Soft Diet (stage 4): 11/24 - 12/21  Regular Diet (stage 5): 12/22    Post-op Diet Handouts:  Diet Guidelines after Weight-loss Surgery  [email or web address]     Your Stage 1 Diet: Clear Liquids  [email or web address]     Your Stage 2 Diet: Low-fat Full Liquids  [email or web address]     Your Stage 3 Diet: Pureed Foods  [email or web address]     Pureed Recipes  [email or web address]    Your Stage 4 Diet: Soft Foods  [email or web address]    Your Stage 5 Diet: Regular Foods  [email or web address]    Keeping Track of Fluids  [email or web address]    Exercise Guidelines after Weight Loss Surgery (1st 4-6 weeks)  [email or web address]       Preventing Low Blood Sugar after Weight Loss Surgery  [email or web address]    Preventing Dumping  Syndrome after Weight Loss Surgery  [email or web address]     Follow-Up: 3 weeks     Time spent with patient: 17 minutes.  VIVIAN Mccormick, RD, LD

## 2021-11-02 ENCOUNTER — VIRTUAL VISIT (OUTPATIENT)
Dept: ENDOCRINOLOGY | Facility: CLINIC | Age: 46
End: 2021-11-02
Payer: COMMERCIAL

## 2021-11-02 VITALS — WEIGHT: 301.5 LBS | HEIGHT: 71 IN | BODY MASS INDEX: 42.21 KG/M2

## 2021-11-02 DIAGNOSIS — E66.01 MORBID OBESITY (H): ICD-10-CM

## 2021-11-02 DIAGNOSIS — Z98.84 S/P LAPAROSCOPIC SLEEVE GASTRECTOMY: ICD-10-CM

## 2021-11-02 DIAGNOSIS — Z98.84 S/P LAPAROSCOPIC SLEEVE GASTRECTOMY: Primary | ICD-10-CM

## 2021-11-02 DIAGNOSIS — Z71.3 NUTRITIONAL COUNSELING: Primary | ICD-10-CM

## 2021-11-02 PROCEDURE — 97803 MED NUTRITION INDIV SUBSEQ: CPT | Mod: GT | Performed by: DIETITIAN, REGISTERED

## 2021-11-02 PROCEDURE — 99024 POSTOP FOLLOW-UP VISIT: CPT | Mod: GT | Performed by: NURSE PRACTITIONER

## 2021-11-02 RX ORDER — CYANOCOBALAMIN 1000 UG/ML
1 INJECTION, SOLUTION INTRAMUSCULAR; SUBCUTANEOUS
Qty: 1 ML | Refills: 11 | Status: SHIPPED | OUTPATIENT
Start: 2021-11-02

## 2021-11-02 RX ORDER — URSODIOL 300 MG/1
300 CAPSULE ORAL 2 TIMES DAILY
Qty: 60 CAPSULE | Refills: 5 | Status: SHIPPED | OUTPATIENT
Start: 2021-11-02

## 2021-11-02 ASSESSMENT — PAIN SCALES - GENERAL: PAINLEVEL: NO PAIN (0)

## 2021-11-02 ASSESSMENT — MIFFLIN-ST. JEOR: SCORE: 2269.73

## 2021-11-02 NOTE — PATIENT INSTRUCTIONS
"Thank you for allowing us the privilege of caring for you. We hope we provided you with the excellent service you deserve.   Please let us know if there is anything else we can do for you so that we can be sure you are completely satisfied with your care experience.    To ensure the quality of our services you may be receiving a patient satisfaction survey from an independent patient satisfaction monitoring company.    The greatest compliment you can give is a \"Likely to Recommend\"    Your visit was with Татьяна Smith NP today.    Instructions per today's visit:     Tray Amaro, it was great to visit with you today.  Here is a review of our visit.  If our clinic scheduler is not able to reach you please call [phone] to schedule your next appointments.    -continue omeprazole (for reflux)   -consider ursodiol (for gallbladder)   -stop senna (stool softener) - can use as needed   -consider reminders to help with fluid intake   -Follow up 11/30       Information about Video Visits with [employer]: video visit information  _________________________________________________________________________________________________________________________________________________________  Important contact and scheduling information:  Please call our contact center at [phone] to schedule your next appointments.  To find a lab location near you, please call [phone].  For any nursing questions or concerns call Jumana Chapman LPN at [phone] or Homa García RN at [phone]  Please call during clinic hours Monday through Friday 8:00a - 4:00p if you have questions or you can contact us via TranslateMedia at anytime and we will reply during clinic hours.    Lab results will be communicated through My Chart or letter (if My Chart not used). Please call the clinic if you have not received communication after 1 week or if you have any questions.?  Clinic Fax: " [phone]  _________________________________________________________________________________________________________________________________________________________  Meal Replacement Products:    Here is the link to our new e-store where you can purchase our meal replacement products     [facility] E-Store  [employer]helf[employer]/store    The one week starter kit is a great way to sample a variety of products and see what works for you.    If you want more information about the product go to: Fresh Steps Meals  [employer]    If you are an employee or [facility] or  [facility] please contact your care team for a 10% estore discount    Free Shipping for orders over $75     Benefits of meal replacements products:    Portion and calorie control  Improved nutrition  Structured eating  Simplified food choices  Avoid contact with trigger foods  _________________________________________________________________________________________________________________________________________________________  Interested in working with a health ?  Health coaches work with you to improve your overall health and wellbeing.  They look at the whole person, and may involve discussion of different areas of life, including, but not limited to the four pillars of health (sleep, exercise, nutrition, and stress management). Discuss with your care team if you would like to start working a health .  Health Coaching-3 Pack: Schedule by calling [phone]    $99 for three health coaching visits    Visits may be done in person or via phone    Coaching is a partnership between the  and the client; Coaches do not prescribe or diagnose    Coaching helps inspire the client to reach his/her personal goals   _________________________________________________________________________________________________________________________________________________________  24 Week Healthy Lifestyle Plan:    Our  mission in the 24-week Healthy Lifestyle Plan is to provide you with individualized care by giving you the tools, education and support you need to lose weight and maintain a healthy lifestyle. In your 24-week journey, you ll be supported by a dedicated weight loss team that includes registered dietitians, medical weight management providers, health coaches, and nurses -- all with special expertise in weight loss -- to help you every step of the way.     Monthly meetings with your registered dietician or medical weight management provider help to review your progress, update your care plan, and make any adjustments needed to ensure success. Between these visits, weekly and bi-weekly health  visits will help you focus on the four pillars of weight loss -- stress, sleep, nutrition, and exercise -- and how you can best adapt each to achieve sustainable weight loss results.    In addition, you will be given exclusive access to online wellbeing classes through Pager.  Your initial visit will be with a medical weight management provider who will help to understand your weight loss goals and ensure this program is the right fit for you. Please let our team know if you are interested in the 24 week plan by sending a message to your care team or calling [phone] to schedule.  _________________________________________________________________________________________________________________________________________________________    COMPREHENSIVE WEIGHT MANAGEMENT PROGRAM  VIRTUAL SUPPORT GROUPS    For Support Group Information:      We offer support groups for patients who are working on weight loss and considering, preparing for or have had weight loss surgery.   There is no cost for this opportunity.  You are invited to attend the?Virtual Support Groups?provided by any of the following locations:    1. [employer] via HotLink Teams with Gabrielle Holland RN  2.   [email or web address] via HotLink Teams with Shaun Du, PhD,  "  3.   [place] via Nivela Teams with Leigh Bartlett RN  4.   [facility] via Nivela Teams with BRIANA [facility]    The following Support Group information can also be found on our website:  [email or web address]      [facility] Weight Loss Surgery Support Group    [facility] Weight Loss Surgery Support Group  The support group is a patient-lead forum that meets monthly to share experiences, encouragement and education. It is open to those who have had weight loss surgery, are scheduled for surgery, and those who are considering surgery.   WHEN: This group meets on the 3rd Wednesday of each month from 5:00PM - 6:00PM virtually using Microsoft Teams.   FACILITATOR: Led by Gabrielle Holland, the program's Clinical Coordinator.   TO REGISTER: Please contact the clinic via Kwarter or call the nurse line directly at [phone] to inform our staff that you would like an invite sent to you and to let us know the email you would like the invite sent to. Prior to the meeting, a link with directions on how to join the meeting will be sent to you.    2021 Meetings  August 18: \"Let's Talk\" a time for the group to share.  September 15: \"Let's Talk\" a time for the group to share.  October 20: \"Let's Talk\" a time for the group to share.  November 17: Lory Hatch RD, MICHAEL \"Protein, Metabolism and Meal Planning\"  December 15: Rahat Hogan RD, LD will speak, \"Recipe Modification\"    [facility] Support Groups    Connections: Bariatric Care Support Group?  This is open to all [facility] (and those external to this program) pre- and post- operative bariatric surgery patients as well as their support system.   WHEN: This group meets the 2nd Tuesday of each month from 6:30 PM - 8:00 PM virtually using Microsoft Teams.   FACILITATOR: Led by Shaun Du, Ph.D who is a Licensed " "Psychologist with the [facility] Comprehensive Weight Management Program.   TO REGISTER: Please send an email to Shaun Du, Ph.D., LP at?[email or web address]?if you would like an invitation to the group and to learn about using Microsoft Teams.    2021 Meetings  August 10: Open Forum  September 14: Guest Speaker: Leigh Bartlett RN,CBN, CIC, [facility] Comprehensive Weight Management Program, \"Your Hospital Stay and Post-Operative Compliance\"  October 12: Open Forum  November 9: Guest Speaker: Elena Singh RD,LD, [facility] Comprehensive Weight Management Program,\"Holiday Eating\".  December 14: Guest Speaker Alley Maya MD, MPH, [facility], \"Body Contouring Surgery for Bariatric Surgery Patients\"     Connections: Post-Operative Bariatric Surgery Support Group  This is a support group for [facility] bariatric patients (and those external to [facility]) who have had bariatric surgery and are at least 3 months post-surgery.  WHEN: This support group meets the 4th Wednesday of the month from 11:00 AM - 12:00 PM virtually using Microsoft Teams.   FACILITATOR: Led by Certified Bariatric Nurse, Leigh Bartlett RN.   TO REGISTER: Please send an email to Leigh at [email or web address] if you would like an invitation to the group and to learn about using Mammotome.      [facility] Healthy Lifestyle Virtual Support Group    Healthy Lifestyle Virtual Support Group?  This is 60 minutes of small group guided discussion, support and resources. All are welcome who want a healthy lifestyle.  WHEN: This group meets monthly on a Friday from 12:30 PM - to 1:30 PM virtually using Microsoft Teams.   FACILITATOR: Led by National Board Certified Health , Leigh Nichols, [facility].   TO REGISTER: Please send an email to Leigh [email or web address] to receive monthly invites to the group or if you have any questions " about having a health .  Prior to the meeting, a link with directions on how to join the meeting will be sent to you.    2021 Meetings  August 27: Open Forum  September 24: Sleep and the 7 Types of Rest  October 29: Open Forum  November 19: Gratitude     December 10: Open Forum  _________________________________________________________________________________________________________________________________________________________  [facility] Get Moving Program  Our team of physical therapists is trained to help you understand and take control of your condition. They will perform a thorough evaluation to determine your ability for activity and develop a customized plan to fit your goals and physical ability.  Scheduling: Unsure if the Get Moving program is right for you? Discuss the program with your medical provider or diabetes educator. You can also call us at [phone] to ask questions or schedule an appointment.   [facility] Get Moving Program  _________________________________________________________________________________________________________________________________________________________  [facility] Diabetes Prevention Program (DPP)  If you have prediabetes and Medicare please contact us via [employer] Magazine[employer] to learn more about the Diabetes Prevention Program (DPP)  Program Details:  [facility] offers the year-long Diabetes Prevention Program (DPP). The program helps you to make lifestyle changes that prevent or delay type 2 diabetes by supporting healthy eating, increased physical activity, stress reduction and use of coping skills.   On average, previous [facility] DPP cohorts have lost and maintained at least 5% of their starting weight throughout the program and averaged more than 150 minutes of physical activity per week.  Participants meet weekly for one-hour group sessions over sixteen weeks, every other week for the next 8 weeks, and monthly for the last six  months.   A year-long maintenance program is also available for participants who complete the first year.   Location & Cost:   During the COVID-19 Public Health Emergency, the program is offered virtually. When in-person classes can resume, they will be held at [facility].  For people with Medicare, the program is covered in full. A self-pay option will also be available for those with non-Medicare insurance plans.   _________________________________________________________________________________________________________________________________________________________  Bluetooth Scale:    We hope to provide you with high quality virtual healthcare visits while social distancing for COVID-19 is necessary, as well as in the future when virtual visits may be more convenient for you.     Our technology team made it possible for Bluetooth scales to send weight measurements to our electronic medical record. This allows weights from you weighing at home to securely flow into the medical record, which will improve telephone and virtual visits.   Additionally, studies have shown that adults actually lose more weight when their weights are automatically sent to someone else, and also that this process is not stressful for those adults.    Below is a link for purchasing the scale, with a discount code for our patients. You may call your insurance company to see if they will reimburse you for the cost of the scale, as a piece of durable medical equipment. The scales only go up to a weight of 400 pounds. This is an issue and we are working with the developer on increasing this. We found no scales that go over 400lb that have blue-tooth for connecting to Fobbler.    Scale to purchase: the Wanderlust  Body  Scale: [email or web address]=aw.ds    Discount Code: We have a discount code for our patients to bring the  cost down to $50, Discount code is: [place]_Scale_20%off      Thank you,   [facility] Comprehensive Weight Management Team    Patient Education     Ursodiol Oral Capsule 300 mg  Uses  This medicine is used for the following purposes:    gallstones    liver disease  Instructions  Take the medicine with food.  Store at room temperature in a dry place. Do not keep in the bathroom.  Keep the medicine away from heat and light.  It is important that you keep taking each dose of this medicine on time even if you are feeling well.  If you forget to take a dose on time, take it as soon as you remember. If it is almost time for the next dose, do not take the missed dose. Return to your normal dosing schedule. Do not take 2 doses of this medicine at one time.  Please tell your doctor and pharmacist about all the medicines you take. Include both prescription and over-the-counter medicines. Also tell them about any vitamins, herbal medicines, or anything else you take for your health.  If your symptoms do not improve or they worsen while on this medicine, contact your doctor.  It is very important that you follow your doctor's instructions for all blood tests.  It is very important that you keep all appointments for medical exams and tests while on this medicine.  Cautions  Tell your doctor and pharmacist if you ever had an allergic reaction to a medicine. Symptoms of an allergic reaction can include trouble breathing, skin rash, itching, swelling, or severe dizziness.  Some patients taking this medicine have experienced serious side effects. Please speak with your doctor to understand the risks and benefits associated with this medicine.  Do not use the medication any more than instructed.  Your ability to stay alert or to react quickly may be impaired by this medicine. Do not drive or operate machinery until you know how this medicine will affect you.  Please check with your doctor before drinking alcohol while on  this medicine.  Tell the doctor or pharmacist if you are pregnant, planning to be pregnant, or breastfeeding.  Ask your pharmacist if this medicine can interact with any of your other medicines. Be sure to tell them about all the medicines you take.  Please tell all your doctors and dentists that you are on this medicine before they provide care.  Do not start or stop any other medicines without first speaking to your doctor or pharmacist.  Do not share this medicine with anyone who has not been prescribed this medicine.  Side Effects  The following is a list of some common side effects from this medicine. Please speak with your doctor about what you should do if you experience these or other side effects.    back pain    coughing    diarrhea    dizziness    hair loss    nausea    stomach upset or abdominal pain  Call your doctor or get medical help right away if you notice any of these more serious side effects:    bleeding that is severe or takes longer to stop    unusual bruising or discoloration on skin    swelling of the legs, feet, and hands    fever or chills    sore throat    thirst    increased urinary frequency    weakness  A few people may have an allergic reactions to this medicine. Symptoms can include difficulty breathing, skin rash, itching, swelling, or severe dizziness. If you notice any of these symptoms, seek medical help quickly.  Extra  Please speak with your doctor, nurse, or pharmacist if you have any questions about this medicine.  [email or web address]  IMPORTANT NOTE: This document tells you briefly how to take your medicine, but it does not tell you all there is to know about it.Your doctor or pharmacist may give you other documents about your medicine. Please talk to them if you have any questions.Always follow their advice. There is a more complete description of this medicine available in English.Scan this code on your smartphone or tablet or use the web address  below. You can also ask your pharmacist for a printout. If you have any questions, please ask your pharmacist.     2021 [employer].

## 2021-11-02 NOTE — PATIENT INSTRUCTIONS
Goals:  1) Follow diet advancement schedule (noted below)  2) Work towards 60+ gm protein/day.  3) Consume 48-64 oz fluids daily- between meals only once on puree diet  4) Eat slowly (>20 min/meal), chewing well to smooth consistency once on the bariatric soft diet.  5) Limit portions to 1/4 to 1/2 cup/meal.  6) Start chewable/liquid multivitamin/minerals twice daily.    Take the following after a Sleeve Gastrectomy:  - Multivitamin/minerals: adult dose 1-2 times daily  Ideally want one that provides:   5,000 - 10,000 international units vitamin A daily   800 mg oral folate daily  8 - 22 mg zinc and 1 - 2 mg copper daily  - Iron: 45-60 mg elemental (18-36 mg if low risk) - may partly or fully be covered in multivitamin   - Calcium Citrate containing vitamin D: 500 mg 3 times daily or 600 mg 2 times daily     - Separate the calcium from your multivitmain or iron by at least 2 hours.     - Must be a chewable calcium citrate until post-op 3 months     - Options for calcium citrate: Ace calcium citrate chewable, bariatric advantage calcium citrate chewable, Celebrate vitamins calcium citrate chewable, Bariatric Fusion calcium citrate chewable  - Vitamin D - at least 3,000 international units/day between all supplements  - Vitamin B12:  injection of 1000 mcg monthly       Post-op Diet Advancement Schedule:  Low-Fat Full Liquid Diet (stage 2): 10/27 -11/9  Pureed Diet (stage 3): 11/10 - 11/23  Soft Diet (stage 4): 11/24 - 12/21  Regular Diet (stage 5): 12/22    Post-op Diet Handouts:  Diet Guidelines after Weight-loss Surgery  [email or web address]     Your Stage 1 Diet: Clear Liquids  [email or web address]     Your Stage 2 Diet: Low-fat Full Liquids  [email or web address]     Your Stage 3 Diet: Pureed Foods  [email or web address]     Pureed Recipes  [email or web address]    Your Stage 4 Diet: Soft Foods  [email or web address]    Your Stage 5 Diet: Regular  Foods  [email or web address]    Keeping Track of Fluids  [email or web address]    Exercise Guidelines after Weight Loss Surgery (1st 4-6 weeks)  [email or web address]       Preventing Low Blood Sugar after Weight Loss Surgery  [email or web address]    Preventing Dumping Syndrome after Weight Loss Surgery  [email or web address]

## 2021-11-02 NOTE — LETTER
"11/2/2021       RE: Pete Amaro  [address] [address] [address]     Dear Colleague,    Thank you for referring your patient, Pete Amaro, to the [facility] WEIGHT MANAGEMENT CLINIC [place] at [facility]. Please see a copy of my visit note below.    Anurag is a 46 year old who is being evaluated via a billable video visit.      How would you like to obtain your AVS? MyChart  If the video visit is dropped, the invitation should be resent by: Text to cell phone: [phone]  Will anyone else be joining your video visit? No      Video Start Time: 0655  Video-Visit Details    Type of service:  Video Visit    Video End Time:0711    Originating Location (pt. Location): Home    Distant Location (provider location):  [facility] WEIGHT MANAGEMENT [facility]     Platform used for Video Visit: Tucker Blair     Postoperative bariatric surgery visit.    Patient underwent sleeve gastrectomy 1 week ago.  10/26/2021 Dr. High - Preop prep for surgery was done at [facility]. New to me. Has follow up with primary care provider today as well in person.     Tolerating liquids: 60oz yesterday, going well. No issues with protein   Lightheadedness: none  Abdominal pain: resolved - had one episode day after getting home and not since   Bowel movements: no issues, taking senna   Fevers/shakes/chills: none  GERD: none  Taking omeprazole once daily   Leg/calf pain: decreased swelling in legs     45min episode of severe pain and diaphoresis day after arriving home- after taking pills and right before having a bowel movement. This has not occurred since.       How many opioid pain medications used after surgery?  16  What did you do with extra pills? At home   Were any opioid pain medication refills provided after surgery? Na   Were any opioid pain medications needed after 30 days postop? Na     Ht 1.803 m (5' 11\")   Wt 136.8 kg (301 lb 8 oz)   BMI " 42.05 kg/m     Starting BMI 51   NAD  Overall looks great  Incisions c/d/i largest incision open a little but healing, non tender per report     Final Diagnosis   A.  STOMACH, PARTIAL GASTRECTOMY :  -Gastric mucosa with mild chronic inactive gastritis  -Negative for intestinal metaplasia and dysplasia  -No H. pylori-like organisms identified on routine stain       Plan:  1. RD visit today  2. Start vitamin supplements per RD directions.  3. Advance diet per RD directions.  4. Follow-up: 11/30/2021  5. Actigall prescription sent to pharmacy   6. B12 SL or injection **  7. Pathology reviewed normal   8. Weight loss medications: stopped naltrexone preop       Татьяна Smith CNP  [facility] WEIGHT MANAGEMENT CLINIC [place]

## 2021-11-02 NOTE — NURSING NOTE
"Chief Complaint   Patient presents with     Surgical Followup     1 Week Follow-up Laparscopic Gastric Sleeve Surgery       Vitals:    11/02/21 0636   Weight: 136.8 kg (301 lb 8 oz)   Height: 1.803 m (5' 11\")       Body mass index is 42.05 kg/m .                          "

## 2021-11-02 NOTE — PROGRESS NOTES
"Anurag is a 46 year old who is being evaluated via a billable video visit.      How would you like to obtain your AVS? MyChart  If the video visit is dropped, the invitation should be resent by: Text to cell phone: [phone]  Will anyone else be joining your video visit? No      Video Start Time: 0655  Video-Visit Details    Type of service:  Video Visit    Video End Time:0711    Originating Location (pt. Location): Home    Distant Location (provider location):  [facility] [place]     Platform used for Video Visit: To8to     Postoperative bariatric surgery visit.    Patient underwent sleeve gastrectomy 1 week ago.  10/26/2021 Dr. High - Preop prep for surgery was done at [facility]. New to me. Has follow up with primary care provider today as well in person.     Tolerating liquids: 60oz yesterday, going well. No issues with protein   Lightheadedness: none  Abdominal pain: resolved - had one episode day after getting home and not since   Bowel movements: no issues, taking senna   Fevers/shakes/chills: none  GERD: none  Taking omeprazole once daily   Leg/calf pain: decreased swelling in legs     45min episode of severe pain and diaphoresis day after arriving home- after taking pills and right before having a bowel movement. This has not occurred since.       How many opioid pain medications used after surgery?  16  What did you do with extra pills? At home   Were any opioid pain medication refills provided after surgery? Na   Were any opioid pain medications needed after 30 days postop? Na     Ht 1.803 m (5' 11\")   Wt 136.8 kg (301 lb 8 oz)   BMI 42.05 kg/m     Starting BMI 51   NAD  Overall looks great  Incisions c/d/i largest incision open a little but healing, non tender per report     Final Diagnosis   A.  STOMACH, PARTIAL GASTRECTOMY :  -Gastric mucosa with mild chronic inactive gastritis  -Negative for intestinal metaplasia and dysplasia  -No H. pylori-like organisms " identified on routine stain       Plan:  1. RD visit today  2. Start vitamin supplements per RD directions.  3. Advance diet per RD directions.  4. Follow-up: 11/30/2021  5. Actigall prescription sent to pharmacy   6. B12 SL or injection **  7. Pathology reviewed normal   8. Weight loss medications: stopped naltrexone preop       FEDERICA [facility] [place]

## 2021-11-02 NOTE — ASSESSMENT & PLAN NOTE
1 week s/p sleeve. Doing well. Pain resolved. Incisions CDI (largest incision opened upon arriving home but has started to close now and there are no signs of infection). No dysphagia. No reflux- will continue omeprazole. No constipation- will stop senna and use just as needed. Hydration and protein are adequate- struggles only with remembering to drink; no lightheadedness.     Pathology normal other than mild gastritis  Ursodiol sent to pharmacy along with education   Follow up 11/30

## 2021-11-02 NOTE — LETTER
"11/2/2021       RE: Pete Amaro  [id number] [address] [address]     Dear Colleague,    Thank you for referring your patient, Pete Amaro, to the [facility] [place] at [facility]. Please see a copy of my visit note below.    Pete Amaro is a 46 year old male who is being evaluated via a billable video visit.      The patient has been notified of following:     \"This video visit will be conducted via a call between you and your physician/provider. We have found that certain health care needs can be provided without the need for an in-person physical exam.  This service lets us provide the care you need with a video conversation.  If a prescription is necessary we can send it directly to your pharmacy.  If lab work is needed we can place an order for that and you can then stop by our lab to have the test done at a later time.    Video visits are billed at different rates depending on your insurance coverage.  Please reach out to your insurance provider with any questions.    If during the course of the call the physician/provider feels a video visit is not appropriate, you will not be charged for this service.\"    How would you like to obtain your AVS? MyChart  If the video visit is dropped, the invitation should be resent by: Text to cell phone: [phone]  Will anyone else be joining your video visit? No      Video-Visit Details    Type of service:  Video Visit    Video Start Time: 8:30 AM   Video End Time: 8:47 AM    Originating Location (pt. Location): Home    Distant Location (provider location):  [facility] [place]     Platform used for Video Visit: Winning Pitch    During this virtual visit the patient is located in [place], patient verifies this as the location during the entirety of this visit.       Nutrition Assessment  Reason For Visit:  Pete Amaro is a 46 " "year old male presenting today for nutrition follow-up, 1 week s/p laparoscopic sleeve gastrectomy with Dr. High 10/26/21.    Patient referred by Dr. High on 10/26/21 and Татьяна Smith on 11/2/21.    Anthropometrics  Initial Consult Weight: 366 lbs per chart review   Day of Surgery Weight 10/26/21: 321 lbs    Estimated body mass index is 42.05 kg/m  as calculated from the following:    Height as of an earlier encounter on 11/2/21: 1.803 m (5' 11\").    Weight as of an earlier encounter on 11/2/21: 136.8 kg (301 lb 8 oz).    Current Weight: 301 lbs    Current Vitamins/Minerals:   None currently    He had bought a sloan liquid force MVI with iron off [employer]. It is a capsule. He has not started it yet. He plans to get Haskell with iron chewable pills instead.    Order placed today by Татьяна for B12 injection.  Ca chew order placed by me.     Nutrition History  Pt reports consuming and tolerating bariatric clear and low-fat full liquid diets. Fluid intake appears adequate, consuming 48-64 oz/day.    Things going well. No concerns per pt.  Reviewed diet protocol. Pt is leaving for [place] today and will be there for 3 weeks.   Also reviewed supplement requirements. Pt plans to go to pharmacy today.    His wife made him 4-8 oz pureed meals. Soup with veggies/hamburger, enchiladas and meat loaf with mashed potatoes.     Nutrition Prescription:  Grams Protein: 60 (minimum)  Amount of Fluid: 48-64 oz      Nutrition Diagnosis  Food and nutrition-related knowledge deficit r/t lack of prior exposure to diet advancements beyond bariatric low-fat full liquid diet aeb pt unable to verbalize understanding of bariatric pureed and soft diets.    Intervention  Intervention At Appointment:  Materials/education provided on bariatric pureed and soft diets, protein intake, fluid intake, eating pace, portion control, avoiding excess sugar and fat, recommended vitamin/mineral supplements. Patient demonstrates understanding.     Expected " Engagement: good    Goals:  1) Follow diet advancement schedule (noted below)  2) Work towards 60+ gm protein/day.  3) Consume 48-64 oz fluids daily- between meals only once on puree diet  4) Eat slowly (>20 min/meal), chewing well to smooth consistency once on the bariatric soft diet.  5) Limit portions to 1/4 to 1/2 cup/meal.  6) Start chewable/liquid multivitamin/minerals twice daily.    Take the following after a Sleeve Gastrectomy:  - Multivitamin/minerals: adult dose 1-2 times daily  Ideally want one that provides:   5,000 - 10,000 international units vitamin A daily   800 mg oral folate daily  8 - 22 mg zinc and 1 - 2 mg copper daily  - Iron: 45-60 mg elemental (18-36 mg if low risk) - may partly or fully be covered in multivitamin   - Calcium Citrate containing vitamin D: 500 mg 3 times daily or 600 mg 2 times daily     - Separate the calcium from your multivitmain or iron by at least 2 hours.     - Must be a chewable calcium citrate until post-op 3 months     - Options for calcium citrate: Ace calcium citrate chewable, bariatric advantage calcium citrate chewable, Celebrate vitamins calcium citrate chewable, Bariatric Fusion calcium citrate chewable  - Vitamin D - at least 3,000 international units/day between all supplements  - Vitamin B12:  injection of 1000 mcg monthly       Post-op Diet Advancement Schedule:  Low-Fat Full Liquid Diet (stage 2): 10/27 -11/9  Pureed Diet (stage 3): 11/10 - 11/23  Soft Diet (stage 4): 11/24 - 12/21  Regular Diet (stage 5): 12/22    Post-op Diet Handouts:  Diet Guidelines after Weight-loss Surgery  [email or web address]     Your Stage 1 Diet: Clear Liquids  [email or web address]     Your Stage 2 Diet: Low-fat Full Liquids  [email or web address]     Your Stage 3 Diet: Pureed Foods  [email or web address]     Pureed Recipes  [email or web address]    Your Stage 4 Diet: Soft Foods  [email or web address]    Your Stage 5 Diet:  Regular Foods  [email or web address]    Keeping Track of Fluids  [email or web address]    Exercise Guidelines after Weight Loss Surgery (1st 4-6 weeks)  [email or web address]       Preventing Low Blood Sugar after Weight Loss Surgery  [email or web address]    Preventing Dumping Syndrome after Weight Loss Surgery  [email or web address]     Follow-Up: 3 weeks     Time spent with patient: 17 minutes.  VIVIAN Mccormick, RD, LD

## 2021-11-26 NOTE — PROGRESS NOTES
"Pete Amaro is a 46 year old male who is being evaluated via a billable video visit.      The patient has been notified of following:     \"This video visit will be conducted via a call between you and your physician/provider. We have found that certain health care needs can be provided without the need for an in-person physical exam.  This service lets us provide the care you need with a video conversation.  If a prescription is necessary we can send it directly to your pharmacy.  If lab work is needed we can place an order for that and you can then stop by our lab to have the test done at a later time.    Video visits are billed at different rates depending on your insurance coverage.  Please reach out to your insurance provider with any questions.    If during the course of the call the physician/provider feels a video visit is not appropriate, you will not be charged for this service.\"    How would you like to obtain your AVS? MyChart  If the video visit is dropped, the invitation should be resent by: Text to cell phone: [phone]  Will anyone else be joining your video visit? No     {If patient encounters technical issues they should call [phone]      Video-Visit Details    Type of service:  Video Visit    Video Start Time: 8:57 am   Video End Time: 9:12 am    Originating Location (pt. Location): Home     Distant Location (provider location):  [facility] [place]      Platform used for Video Visit: [employer]     During this virtual visit the patient is located in [place], patient verifies this as the location during the entirety of this visit.     Nutrition Reassessment  Reason For Visit:  Pete Amaro is a 46 year old male presenting today for nutrition follow-up, 1 month s/p laparoscopic sleeve gastrectomy with Dr. High 10/26/21.    Anthropometrics:  Initial Consult Weight: 366 lbs per chart review   Day of Surgery Weight 10/26/21: 321 lbs    Estimated body " "mass index is 38.77 kg/m  as calculated from the following:    Height as of an earlier encounter on 11/30/21: 1.803 m (5' 11\").    Weight as of an earlier encounter on 11/30/21: 126.1 kg (278 lb).    Current Weight: 278 lbs    Weight loss: -88 lbs (24.04%) from initial consult; -43 lbs from day of surgery    Medications for weight loss:  Naltrexone 1/2 tab 2x/day  - Is going to try stopping it for a few days to see if appetite approves    Current Vitamins/Minerals:   Rowe complete with iron - has been taking 1x/day. Will increase to 2x/day     Order placed today by Татьяна for B12 injection.  Ca chew order placed by me. - pharmacy gave him pill form. Recommend he look into chewable. Plans to start taking 2x/day.    Nutrition History:    Doing well with hydration.    BM: occasional constipation, taking senna daily, taking 1 packet bene fiber daily     Vomiting if eating too much or too fast. Finding his limit is about 8 oz food. Takes about 10 minutes to eat meals.    Struggling with getting good nutrition. Food is not sounding good - feels grossed out and not wanting to eat. Feels he is not getting enough kcal to lose weight.    Typical day  - 1 packet oatmeal  - Protein shake  - One meal (pureed when on puree diet)    This week meal prepped turkey and cheese.    Progress with Previous Goals:  1) Follow diet advancement schedule (noted below)  2) Work towards 60+ gm protein/day.  3) Consume 48-64 oz fluids daily- between meals only once on puree diet  4) Eat slowly (>20 min/meal), chewing well to smooth consistency once on the bariatric soft diet.  5) Limit portions to 1/4 to 1/2 cup/meal.  6) Start chewable/liquid multivitamin/minerals twice daily.    Nutrition Prescription:  Grams Protein: 60 (minimum)   Amount of Fluid: 48-64 oz    Nutrition Diagnosis  Previous: Food and nutrition-related knowledge deficit r/t lack of prior exposure to diet advancements beyond bariatric low-fat full liquid diet aeb pt unable " to verbalize understanding of bariatric pureed and soft diets.    Current: Food and nutrition-related knowledge deficit r/t lack of prior exposure to diet advancements beyond bariatric pureed diet aeb pt unable to verbalize full understanding of bariatric soft and regular consistency diets.    Intervention  Materials/Education provided on bariatric soft and regular consistency diets, protein intake, fluid intake, eating pace, chewing foods well, portion control, sugar/fat intake, recommended vitamin/mineral supplements. Patient demonstrates understanding.       Expected Engagement: good    Goals:  1) Follow soft diet for 4 weeks, then to progress to bariatric regular diet.   2) Consume 60+ grams of protein/day as able  3) Sip on 48-64 oz of fluids/day- between meals only.  4) Eat slowly (>20 min/meal), chewing foods well (to applesauce-like consistency).  5) Limit portions to ~1/2 cup/meal (general recommendation - will vary depending on person as well as what you are eating)  6) Stop Naltrexone for a few days. Let us know if appetite improves or not  7)Vitamins/Minerals:  Increase to two MVI per day.   Start taking Calcium 2x/day. Separate Ca and MVI by at least 2 hrs ideally. Can look for chewable calcium online if yo would like.  Start B12 injection 1x/month       Take the following after a Sleeve Gastrectomy:  - Multivitamin/minerals: adult dose 1-2 times daily  Ideally want one that provides:   5,000 - 10,000 international units vitamin A daily              800 mg oral folate daily  8 - 22 mg zinc and 1 - 2 mg copper daily  - Iron: 45-60 mg elemental (18-36 mg if low risk) - may partly or fully be covered in multivitamin   - Calcium Citrate containing vitamin D: 500 mg 3 times daily or 600 mg 2 times daily              - Separate the calcium from your multivitmain or iron by at least 2 hours.               - Must be a chewable calcium citrate until post-op 3 months               - Options for calcium citrate:  Ace calcium citrate chewable, bariatric advantage calcium citrate chewable, Celebrate vitamins calcium citrate chewable, Bariatric Fusion calcium citrate chewable  - Vitamin D - at least 3,000 international units/day between all supplements  - Vitamin B12:  injection of 1000 mcg monthly      Post-op Diet Advancement Schedule:  Soft Diet (stage 4): 11/24 - 12/21  Regular Diet (stage 5): 12/22    Post-op Diet Handouts:  Diet Guidelines after Weight-loss Surgery  [email or web address]     Your Stage 4 Diet: Soft Foods  [email or web address]    Your Stage 5 Diet: Regular Foods  [email or web address]    Keeping Track of Fluids  [email or web address]    Exercises after Weight Loss Surgery (strengthening, when no weight lifting restrictions)  [email or web address]    Preventing Low Blood Sugar after Weight Loss Surgery  [email or web address]     Preventing Dumping Syndrome after Weight Loss Surgery  [email or web address]           Follow-Up: 3 months post op    Time spent with patient: 15 minutes.  VIVIAN Mccormick, RD LD

## 2021-11-30 ENCOUNTER — VIRTUAL VISIT (OUTPATIENT)
Dept: ENDOCRINOLOGY | Facility: CLINIC | Age: 46
End: 2021-11-30
Payer: COMMERCIAL

## 2021-11-30 VITALS — WEIGHT: 278 LBS | BODY MASS INDEX: 38.92 KG/M2 | HEIGHT: 71 IN

## 2021-11-30 DIAGNOSIS — Z98.84 S/P LAPAROSCOPIC SLEEVE GASTRECTOMY: Primary | ICD-10-CM

## 2021-11-30 DIAGNOSIS — E66.812 OBESITY, CLASS II, BMI 35-39.9: ICD-10-CM

## 2021-11-30 DIAGNOSIS — Z71.3 NUTRITIONAL COUNSELING: Primary | ICD-10-CM

## 2021-11-30 DIAGNOSIS — Z86.39 HISTORY OF MORBID OBESITY: ICD-10-CM

## 2021-11-30 DIAGNOSIS — E66.01 MORBID OBESITY (H): ICD-10-CM

## 2021-11-30 DIAGNOSIS — Z98.84 S/P LAPAROSCOPIC SLEEVE GASTRECTOMY: ICD-10-CM

## 2021-11-30 PROCEDURE — 99024 POSTOP FOLLOW-UP VISIT: CPT | Mod: GT | Performed by: NURSE PRACTITIONER

## 2021-11-30 PROCEDURE — 97803 MED NUTRITION INDIV SUBSEQ: CPT | Mod: GT | Performed by: DIETITIAN, REGISTERED

## 2021-11-30 RX ORDER — AMOXICILLIN 250 MG
2 CAPSULE ORAL DAILY PRN
Qty: 30 TABLET | Refills: 1 | Status: SHIPPED | OUTPATIENT
Start: 2021-11-30

## 2021-11-30 RX ORDER — DILTIAZEM HYDROCHLORIDE 360 MG/1
CAPSULE, EXTENDED RELEASE ORAL
COMMUNITY
Start: 2021-10-14

## 2021-11-30 RX ORDER — CYANOCOBALAMIN 1000 UG/ML
1 INJECTION, SOLUTION INTRAMUSCULAR; SUBCUTANEOUS
Qty: 3 ML | Refills: 3 | Status: SHIPPED | OUTPATIENT
Start: 2021-11-30

## 2021-11-30 ASSESSMENT — MIFFLIN-ST. JEOR: SCORE: 2163.13

## 2021-11-30 NOTE — NURSING NOTE
Chief Complaint   Patient presents with     Surgical Followup       Vitals:    11/30/21 0747   Weight: 278 lb       Body mass index is 38.77 kg/m .      Bonilla Urias, EMT  Surgery Clinic

## 2021-11-30 NOTE — PATIENT INSTRUCTIONS
Goals:  1) Follow soft diet for 4 weeks, then to progress to bariatric regular diet.   2) Consume 60+ grams of protein/day as able  3) Sip on 48-64 oz of fluids/day- between meals only.  4) Eat slowly (>20 min/meal), chewing foods well (to applesauce-like consistency).  5) Limit portions to ~1/2 cup/meal (general recommendation - will vary depending on person as well as what you are eating)  6) Stop Naltrexone for a few days. Let us know if appetite improves or not  7)Vitamins/Minerals:  Increase to two MVI per day.   Start taking Calcium 2x/day. Separate Ca and MVI by at least 2 hrs ideally. Can look for chewable calcium online if yo would like.  Start B12 injection 1x/month       Take the following after a Sleeve Gastrectomy:  - Multivitamin/minerals: adult dose 1-2 times daily  Ideally want one that provides:   5,000 - 10,000 international units vitamin A daily              800 mg oral folate daily  8 - 22 mg zinc and 1 - 2 mg copper daily  - Iron: 45-60 mg elemental (18-36 mg if low risk) - may partly or fully be covered in multivitamin   - Calcium Citrate containing vitamin D: 500 mg 3 times daily or 600 mg 2 times daily              - Separate the calcium from your multivitmain or iron by at least 2 hours.               - Must be a chewable calcium citrate until post-op 3 months               - Options for calcium citrate: Ace calcium citrate chewable, bariatric advantage calcium citrate chewable, Celebrate vitamins calcium citrate chewable, Bariatric Fusion calcium citrate chewable  - Vitamin D - at least 3,000 international units/day between all supplements  - Vitamin B12:  injection of 1000 mcg monthly      Post-op Diet Advancement Schedule:  Soft Diet (stage 4): 11/24 - 12/21  Regular Diet (stage 5): 12/22    Post-op Diet Handouts:  Diet Guidelines after Weight-loss Surgery  [email or web address]     Your Stage 4 Diet: Soft Foods  [email or web address]    Your Stage 5 Diet: Regular  Foods  [email or web address]    Keeping Track of Fluids  [email or web address]    Exercises after Weight Loss Surgery (strengthening, when no weight lifting restrictions)  [email or web address]    Preventing Low Blood Sugar after Weight Loss Surgery  [email or web address]     Preventing Dumping Syndrome after Weight Loss Surgery  [email or web address]           Follow-Up: 3 months post op

## 2021-11-30 NOTE — LETTER
"11/30/2021       RE: Pete Amaro  [address] [address] [address]     Dear Colleague,    Thank you for referring your patient, Pete Amaro, to the [facility] [place] at [facility]. Please see a copy of my visit note below.    During this virtual visit the patient is located in [place], patient verifies this as the location during the entirety of this visit.     Anurag is a 46 year old who is being evaluated via a billable video visit.      How would you like to obtain your AVS? MyChart  If the video visit is dropped, the invitation should be resent by: Text to cell phone:  [phone]  Will anyone else be joining your video visit? No    Bonilla Urias NREMT      Postoperative bariatric surgery visit.    Patient underwent sleeve gastrectomy 5 weeks ago.  10/26/2021 Dr. High - abelino done with [facility]     Tolerating liquids: no issues, about 60oz daily   Lightheadedness: occasional, more with lifting, not monitoring blood pressure   Abdominal pain: resolved   Bowel movements: occasional constipation, taking senna daily, taking 1 packet bene fiber daily   Fevers/shakes/chills: none  GERD: occasional Taking omeprazole once daily   Leg/calf pain: less swelling than baseline     Continues to struggle to get adequate nutrition. Has doing protein shake most day and meal prepped some eggs and cheese. \"grossed out\" by most foods. Continues to take naltrexone 1/2 tab twice daily.     Occasional vomiting with eating too quickly     Improved mobility and energy     How many opioid pain medications used after surgery?  What did you do with extra pills?  Were any opioid pain medication refills provided after surgery?  Were any opioid pain medications needed after 30 days postop?    Ht 1.803 m (5' 11\")   Wt 126.1 kg (278 lb)   BMI 38.77 kg/m     Wt Readings from Last 5 Encounters:   11/30/21 126.1 kg (278 lb)   11/02/21 " 136.8 kg (301 lb 8 oz)   10/26/21 146 kg (321 lb 14 oz)   08/03/21 147.4 kg (325 lb)      NAD  Overall looks well   Incisions c/d/i; non-tender per report     Plan:  1. RD visit today.  2. Start vitamin supplements per RD directions.  3. Advance diet per RD directions.  4. Follow-up: 2 months with labs   5. Actigall prescription sent 1 week postop, started   6. B12 injection  7. Pathology reviewed normal   8. Weight loss medications stopped naltrexone preop       Татьяна Smith CNP  [facility] [place]     Labs faxed to [facility]             Again, thank you for allowing me to participate in the care of your patient.      Sincerely,    Татьяна Smith NP

## 2021-11-30 NOTE — PROGRESS NOTES
During this virtual visit the patient is located in [place], patient verifies this as the location during the entirety of this visit.     Anurag is a 46 year old who is being evaluated via a billable video visit.      How would you like to obtain your AVS? MyChart  If the video visit is dropped, the invitation should be resent by: Text to cell phone:  [phone]  Will anyone else be joining your video visit? No      Video Start Time: 0832  Video-Visit Details    Type of service:  Video Visit    Video End Time:0850    Originating Location (pt. Location): Home    Distant Location (provider location):  [facility] [place]     Platform used for Video Visit: Mari Urias NREMT

## 2021-11-30 NOTE — LETTER
"11/30/2021       RE: Pete Amaro  [address] [address] [address]     Dear Colleague,    Thank you for referring your patient, Pete Amaro, to the [facility] [place] [facility]. Please see a copy of my visit note below.    Pete Amaro is a 46 year old male who is being evaluated via a billable video visit.      The patient has been notified of following:     \"This video visit will be conducted via a call between you and your physician/provider. We have found that certain health care needs can be provided without the need for an in-person physical exam.  This service lets us provide the care you need with a video conversation.  If a prescription is necessary we can send it directly to your pharmacy.  If lab work is needed we can place an order for that and you can then stop by our lab to have the test done at a later time.    Video visits are billed at different rates depending on your insurance coverage.  Please reach out to your insurance provider with any questions.    If during the course of the call the physician/provider feels a video visit is not appropriate, you will not be charged for this service.\"    How would you like to obtain your AVS? MyChart  If the video visit is dropped, the invitation should be resent by: Text to cell phone: [phone]  Will anyone else be joining your video visit? No     {If patient encounters technical issues they should call [phone]      Video-Visit Details    Type of service:  Video Visit    Video Start Time: 8:57 am   Video End Time: 9:12 am    Originating Location (pt. Location): Home     Distant Location (provider location):  [facility] [place]      Platform used for Video Visit: "[employer]     During this virtual visit the patient is located in [place], patient verifies this as the location during the entirety of this " "visit.     Nutrition Reassessment  Reason For Visit:  Pete Amaro is a 46 year old male presenting today for nutrition follow-up, 1 month s/p laparoscopic sleeve gastrectomy with Dr. High 10/26/21.    Anthropometrics:  Initial Consult Weight: 366 lbs per chart review   Day of Surgery Weight 10/26/21: 321 lbs    Estimated body mass index is 38.77 kg/m  as calculated from the following:    Height as of an earlier encounter on 11/30/21: 1.803 m (5' 11\").    Weight as of an earlier encounter on 11/30/21: 126.1 kg (278 lb).    Current Weight: 278 lbs    Weight loss: -88 lbs (24.04%) from initial consult; -43 lbs from day of surgery    Medications for weight loss:  Naltrexone 1/2 tab 2x/day  - Is going to try stopping it for a few days to see if appetite approves    Current Vitamins/Minerals:   [place] complete with iron - has been taking 1x/day. Will increase to 2x/day     Order placed today by Татьяна for B12 injection.  Ca chew order placed by me. - pharmacy gave him pill form. Recommend he look into chewable. Plans to start taking 2x/day.    Nutrition History:    Doing well with hydration.    BM: occasional constipation, taking senna daily, taking 1 packet bene fiber daily     Vomiting if eating too much or too fast. Finding his limit is about 8 oz food. Takes about 10 minutes to eat meals.    Struggling with getting good nutrition. Food is not sounding good - feels grossed out and not wanting to eat. Feels he is not getting enough kcal to lose weight.    Typical day  - 1 packet oatmeal  - Protein shake  - One meal (pureed when on puree diet)    This week meal prepped turkey and cheese.    Progress with Previous Goals:  1) Follow diet advancement schedule (noted below)  2) Work towards 60+ gm protein/day.  3) Consume 48-64 oz fluids daily- between meals only once on puree diet  4) Eat slowly (>20 min/meal), chewing well to smooth consistency once on the bariatric soft diet.  5) Limit portions to 1/4 to 1/2 " cup/meal.  6) Start chewable/liquid multivitamin/minerals twice daily.    Nutrition Prescription:  Grams Protein: 60 (minimum)   Amount of Fluid: 48-64 oz    Nutrition Diagnosis  Previous: Food and nutrition-related knowledge deficit r/t lack of prior exposure to diet advancements beyond bariatric low-fat full liquid diet aeb pt unable to verbalize understanding of bariatric pureed and soft diets.    Current: Food and nutrition-related knowledge deficit r/t lack of prior exposure to diet advancements beyond bariatric pureed diet aeb pt unable to verbalize full understanding of bariatric soft and regular consistency diets.    Intervention  Materials/Education provided on bariatric soft and regular consistency diets, protein intake, fluid intake, eating pace, chewing foods well, portion control, sugar/fat intake, recommended vitamin/mineral supplements. Patient demonstrates understanding.       Expected Engagement: good    Goals:  1) Follow soft diet for 4 weeks, then to progress to bariatric regular diet.   2) Consume 60+ grams of protein/day as able  3) Sip on 48-64 oz of fluids/day- between meals only.  4) Eat slowly (>20 min/meal), chewing foods well (to applesauce-like consistency).  5) Limit portions to ~1/2 cup/meal (general recommendation - will vary depending on person as well as what you are eating)  6) Stop Naltrexone for a few days. Let us know if appetite improves or not  7)Vitamins/Minerals:  Increase to two MVI per day.   Start taking Calcium 2x/day. Separate Ca and MVI by at least 2 hrs ideally. Can look for chewable calcium online if yo would like.  Start B12 injection 1x/month       Take the following after a Sleeve Gastrectomy:  - Multivitamin/minerals: adult dose 1-2 times daily  Ideally want one that provides:   5,000 - 10,000 international units vitamin A daily              800 mg oral folate daily  8 - 22 mg zinc and 1 - 2 mg copper daily  - Iron: 45-60 mg elemental (18-36 mg if low risk) - may  partly or fully be covered in multivitamin   - Calcium Citrate containing vitamin D: 500 mg 3 times daily or 600 mg 2 times daily              - Separate the calcium from your multivitmain or iron by at least 2 hours.               - Must be a chewable calcium citrate until post-op 3 months               - Options for calcium citrate: Ace calcium citrate chewable, bariatric advantage calcium citrate chewable, Celebrate vitamins calcium citrate chewable, Bariatric Fusion calcium citrate chewable  - Vitamin D - at least 3,000 international units/day between all supplements  - Vitamin B12:  injection of 1000 mcg monthly      Post-op Diet Advancement Schedule:  Soft Diet (stage 4): 11/24 - 12/21  Regular Diet (stage 5): 12/22    Post-op Diet Handouts:  Diet Guidelines after Weight-loss Surgery  [email or web address]     Your Stage 4 Diet: Soft Foods  [email or web address]    Your Stage 5 Diet: Regular Foods  [email or web address]    Keeping Track of Fluids  [email or web address]    Exercises after Weight Loss Surgery (strengthening, when no weight lifting restrictions)  [email or web address]    Preventing Low Blood Sugar after Weight Loss Surgery  [email or web address]     Preventing Dumping Syndrome after Weight Loss Surgery  [email or web address]           Follow-Up: 3 months post op    Time spent with patient: 15 minutes.  VIVIAN Mccormick, RD LD

## 2021-11-30 NOTE — PROGRESS NOTES
"Postoperative bariatric surgery visit.    Patient underwent sleeve gastrectomy 5 weeks ago.  10/26/2021 Dr. High - preop done with [facility]     Tolerating liquids: no issues, about 60oz daily   Lightheadedness: occasional, more with lifting, not monitoring blood pressure   Abdominal pain: resolved   Bowel movements: occasional constipation, taking senna daily, taking 1 packet bene fiber daily   Fevers/shakes/chills: none  GERD: occasional Taking omeprazole once daily   Leg/calf pain: less swelling than baseline     Continues to struggle to get adequate nutrition. Has doing protein shake most day and meal prepped some eggs and cheese. \"grossed out\" by most foods. Continues to take naltrexone 1/2 tab twice daily.     Occasional vomiting with eating too quickly     Improved mobility and energy     How many opioid pain medications used after surgery?  What did you do with extra pills?  Were any opioid pain medication refills provided after surgery?  Were any opioid pain medications needed after 30 days postop?    Ht 1.803 m (5' 11\")   Wt 126.1 kg (278 lb)   BMI 38.77 kg/m     Wt Readings from Last 5 Encounters:   11/30/21 126.1 kg (278 lb)   11/02/21 136.8 kg (301 lb 8 oz)   10/26/21 146 kg (321 lb 14 oz)   08/03/21 147.4 kg (325 lb)      NAD  Overall looks well   Incisions c/d/i; non-tender per report     Plan:  1. RD visit today.  2. Start vitamin supplements per RD directions.  3. Advance diet per RD directions.  4. Follow-up: 2 months with labs   5. Actigall prescription sent 1 week postop, started   6. B12 injection  7. Pathology reviewed normal   8. Weight loss medications stopped naltrexone preop       Татьяна Smith CNP  [facility] [place]     Labs faxed to [facility]   "

## 2021-12-01 NOTE — ASSESSMENT & PLAN NOTE
5 weeks post op. Feels great. Doing well. Abdominal pain has resolved. Incisions have healed, no tenderness. No dysphagia. Some reflux, well controlled with omeprazole once daily. Occasional constipation despite taking benefiber daily and senna daily. Recommend tapering back on the senna now and increasing benefiber.     Occasionally eating too quickly and vomiting in response, improving over time.   Improved mobility and energy.   Overall, describes being disgusted by food which is partially limiting his intake. He has restarted his preop naltrexone 25mg BID. I have asked him to stop this for now to allow for adequate nutrition. If we need in the future, we can restart. Despite this he is getting adequate protein and hydration.     Follow up in 2 months with labs

## 2021-12-01 NOTE — PATIENT INSTRUCTIONS
"Thank you for allowing us the privilege of caring for you. We hope we provided you with the excellent service you deserve.   Please let us know if there is anything else we can do for you so that we can be sure you are completely satisfied with your care experience.    To ensure the quality of our services you may be receiving a patient satisfaction survey from an independent patient satisfaction monitoring company.    The greatest compliment you can give is a \"Likely to Recommend\"    Your visit was with Татьяна Smith NP today.    Instructions per today's visit:     Tray Amaro, it was great to visit with you today.  Here is a review of our visit.  If our clinic scheduler is not able to reach you please call [phone] to schedule your next appointments.    -stop naltrexone for now  -continue with good hydration  -continue to work on 3 meals a day   -no activity restrictions any more  -follow up in 2 months with labs       Information about Video Visits with [facility]: video visit information  _________________________________________________________________________________________________________________________________________________________  Important contact and scheduling information:  Please call our contact center at [phone] to schedule your next appointments.  To find a lab location near you, please call [phone].  For any nursing questions or concerns call Jumana Chapman LPN at [phone] or Homa García RN at [phone]  Please call during clinic hours Monday through Friday 8:00a - 4:00p if you have questions or you can contact us via [email or web address] at anytime and we will reply during clinic hours.    Lab results will be communicated through My Chart or letter (if My Chart not used). Please call the clinic if you have not received communication after 1 week or if you have any questions.?  Clinic Fax: " [phone]  _________________________________________________________________________________________________________________________________________________________  Meal Replacement Products:    Here is the link to our new e-store where you can purchase our meal replacement products     [employer] [place] E-Store  [employer]/store    The one week starter kit is a great way to sample a variety of products and see what works for you.    If you want more information about the product go to: Fresh Steps Meals  Really Simple.KUBOO    If you are an employee or [facility] or  [employer] [place] please contact your care team for a 10% estore discount    Free Shipping for orders over $75     Benefits of meal replacements products:    Portion and calorie control  Improved nutrition  Structured eating  Simplified food choices  Avoid contact with trigger foods  _________________________________________________________________________________________________________________________________________________________  Interested in working with a health ?  Health coaches work with you to improve your overall health and wellbeing.  They look at the whole person, and may involve discussion of different areas of life, including, but not limited to the four pillars of health (sleep, exercise, nutrition, and stress management). Discuss with your care team if you would like to start working a health .  Health Coaching-3 Pack: Schedule by calling [phone]    $99 for three health coaching visits    Visits may be done in person or via phone    Coaching is a partnership between the  and the client; Coaches do not prescribe or diagnose    Coaching helps inspire the client to reach his/her personal goals   _________________________________________________________________________________________________________________________________________________________  24 Week Healthy Lifestyle Plan:    Our  mission in the 24-week Healthy Lifestyle Plan is to provide you with individualized care by giving you the tools, education and support you need to lose weight and maintain a healthy lifestyle. In your 24-week journey, you ll be supported by a dedicated weight loss team that includes registered dietitians, medical weight management providers, health coaches, and nurses -- all with special expertise in weight loss -- to help you every step of the way.     Monthly meetings with your registered dietician or medical weight management provider help to review your progress, update your care plan, and make any adjustments needed to ensure success. Between these visits, weekly and bi-weekly health  visits will help you focus on the four pillars of weight loss -- stress, sleep, nutrition, and exercise -- and how you can best adapt each to achieve sustainable weight loss results.    In addition, you will be given exclusive access to online wellbeing classes through [employer].  Your initial visit will be with a medical weight management provider who will help to understand your weight loss goals and ensure this program is the right fit for you. Please let our team know if you are interested in the 24 week plan by sending a message to your care team or calling [phone] to schedule.  _________________________________________________________________________________________________________________________________________________________    COMPREHENSIVE WEIGHT MANAGEMENT PROGRAM  VIRTUAL SUPPORT GROUPS    For Support Group Information:      We offer support groups for patients who are working on weight loss and considering, preparing for or have had weight loss surgery.   There is no cost for this opportunity.  You are invited to attend the?Virtual Support Groups?provided by any of the following locations:    1. [email or web address] via Tasted Menu Teams with Gabrielle Holland RN  2.   [facility] via Tasted Menu Teams with Shaun Du, PhD,  "  3.   [place] via Kixer Teams with Leigh Bartlett RN  4.   [facility] via Kixer Teams with BRIANA [facility]    The following Support Group information can also be found on our website:  [email or web address]      [facility] Weight Loss Surgery Support Group    [facility] Weight Loss Surgery Support Group  The support group is a patient-lead forum that meets monthly to share experiences, encouragement and education. It is open to those who have had weight loss surgery, are scheduled for surgery, and those who are considering surgery.   WHEN: This group meets on the 3rd Wednesday of each month from 5:00PM - 6:00PM virtually using Microsoft Teams.   FACILITATOR: Led by Gabrielle Holland, the program's Clinical Coordinator.   TO REGISTER: Please contact the clinic via Helpa or call the nurse line directly at [phone] to inform our staff that you would like an invite sent to you and to let us know the email you would like the invite sent to. Prior to the meeting, a link with directions on how to join the meeting will be sent to you.    2021 Meetings  August 18: \"Let's Talk\" a time for the group to share.  September 15: \"Let's Talk\" a time for the group to share.  October 20: \"Let's Talk\" a time for the group to share.  November 17: Lory Hatch RD, MICHAEL \"Protein, Metabolism and Meal Planning\"  December 15: Rahat Hogan RD, LD will speak, \"Recipe Modification\"    [facility] Support Groups    Connections: Bariatric Care Support Group?  This is open to all [facility] (and those external to this program) pre- and post- operative bariatric surgery patients as well as their support system.   WHEN: This group meets the 2nd Tuesday of each month from 6:30 PM - 8:00 PM virtually using Microsoft Teams.   FACILITATOR: Led by Shaun Du, Ph.D who is a Licensed " "Psychologist with the [facility] Comprehensive Weight Management Program.   TO REGISTER: Please send an email to Shaun Du, Ph.D., LP at?[email or web address]?if you would like an invitation to the group and to learn about using Microsoft Teams.    2021 Meetings  August 10: Open Forum  September 14: Guest Speaker: Leigh Bartlett RN,CBN, CIC, [facility] Comprehensive Weight Management Program, \"Your Hospital Stay and Post-Operative Compliance\"  October 12: Open Forum  November 9: Guest Speaker: Elena Singh RD,LD, [facility] Comprehensive Weight Management Program,\"Holiday Eating\".  December 14: Guest Speaker Alley Maya MD, MPH, [facility], \"Body Contouring Surgery for Bariatric Surgery Patients\"     Connections: Post-Operative Bariatric Surgery Support Group  This is a support group for [facility] bariatric patients (and those external to [facility]) who have had bariatric surgery and are at least 3 months post-surgery.  WHEN: This support group meets the 4th Wednesday of the month from 11:00 AM - 12:00 PM virtually using Microsoft Teams.   FACILITATOR: Led by Certified Bariatric Nurse, Leigh Bartlett RN.   TO REGISTER: Please send an email to Leigh at [email or web address] if you would like an invitation to the group and to learn about using WP Engine.      [facility] Healthy Lifestyle Virtual Support Group    Healthy Lifestyle Virtual Support Group?  This is 60 minutes of small group guided discussion, support and resources. All are welcome who want a healthy lifestyle.  WHEN: This group meets monthly on a Friday from 12:30 PM - to 1:30 PM virtually using Microsoft Teams.   FACILITATOR: Led by National Board Certified Health , Leigh Nichols, [facility].   TO REGISTER: Please send an email to Leigh [email or web address] to receive monthly invites to the group or if you have any questions " about having a health .  Prior to the meeting, a link with directions on how to join the meeting will be sent to you.    2021 Meetings  August 27: Open Forum  September 24: Sleep and the 7 Types of Rest  October 29: Open Forum  November 19: Gratitude     December 10: Open Forum    2022 Meetings January 21: Healthy Habits  February 25: Open Forum  March 18: Setting limits and Boundaries  April 29: Nutrition  May 20: Open Forum  ____________________________________________________________________________________________________________________________________________________________________________  [place] [facility] Get Moving Program  Our team of physical therapists is trained to help you understand and take control of your condition. They will perform a thorough evaluation to determine your ability for activity and develop a customized plan to fit your goals and physical ability.  Scheduling: Unsure if the Get Moving program is right for you? Discuss the program with your medical provider or diabetes educator. You can also call us at [phone] to ask questions or schedule an appointment.   [facility] Get Moving Program  ____________________________________________________________________________________________________________________________________________________________________________  [facility] Diabetes Prevention Program (DPP)  If you have prediabetes and Medicare please contact us via [employer] to learn more about the Diabetes Prevention Program (DPP)  Program Details:  [facility] offers the year-long Diabetes Prevention Program (DPP). The program helps you to make lifestyle changes that prevent or delay type 2 diabetes by supporting healthy eating, increased physical activity, stress reduction and use of coping skills.   On average, previous [facility] DPP cohorts have lost and maintained at least 5% of their starting weight throughout the program and averaged more than  150 minutes of physical activity per week.  Participants meet weekly for one-hour group sessions over sixteen weeks, every other week for the next 8 weeks, and monthly for the last six months.   A year-long maintenance program is also available for participants who complete the first year.   Location & Cost:   During the COVID-19 Public Health Emergency, the program is offered virtually. When in-person classes can resume, they will be held at [facility].  For people with Medicare, the program is covered in full. A self-pay option will also be available for those with non-Medicare insurance plans.   _________________________________________________________________________________________________________________________________________________________  Bluetooth Scale:    We hope to provide you with high quality virtual healthcare visits while social distancing for COVID-19 is necessary, as well as in the future when virtual visits may be more convenient for you.     Our technology team made it possible for Bluetooth scales to send weight measurements to our electronic medical record. This allows weights from you weighing at home to securely flow into the medical record, which will improve telephone and virtual visits.   Additionally, studies have shown that adults actually lose more weight when their weights are automatically sent to someone else, and also that this process is not stressful for those adults.    Below is a link for purchasing the scale, with a discount code for our patients. You may call your insurance company to see if they will reimburse you for the cost of the scale, as a piece of durable medical equipment. The scales only go up to a weight of 400 pounds. This is an issue and we are working with the developer on increasing this. We found no scales that go over 400lb that have blue-tooth for connecting to MyChart.    Scale to purchase: the [employer]  Body  Scale:  [email or web address]/[email or web address]/shop?[email or web address]=aw.ds    Discount Code: We have a discount code for our patients to bring the cost down to $50, Discount code is: [place]_Scale_20%off      Thank you,   [facility] Comprehensive Weight Management Team

## 2022-10-03 ENCOUNTER — HEALTH MAINTENANCE LETTER (OUTPATIENT)
Age: 47
End: 2022-10-03

## 2023-09-23 NOTE — TELEPHONE ENCOUNTER
Patient called to schedule PAC visit, scheduled 10/5 via video visit. He wanted to have his COVID-19 testing, CMP and lipid testing done at [facility] in [place]. He will need the COVID-19 testing done on Friday, 10/22, surgery scheduled 10/26. I faxed these to [phone] attention: Zahra.   
Male

## 2023-10-21 ENCOUNTER — HEALTH MAINTENANCE LETTER (OUTPATIENT)
Age: 48
End: 2023-10-21

## 2024-12-14 ENCOUNTER — HEALTH MAINTENANCE LETTER (OUTPATIENT)
Age: 49
End: 2024-12-14

## (undated) DEVICE — DECANTER BAG 2002S

## (undated) DEVICE — CLIP APPLIER ENDO 5MM M/L LIGAMAX EL5ML

## (undated) DEVICE — Device

## (undated) DEVICE — SYSTEM CALIBRATION GASTRECTOMY SLEEVE VISIGI 3D 40FR 5240

## (undated) DEVICE — NDL SPINAL 22GA 3.5" QUINCKE 405181

## (undated) DEVICE — STPL POWERED ECHELON LONG 60MM PLEE60A

## (undated) DEVICE — STPL SKIN 35W ROTATING HEAD PRW35

## (undated) DEVICE — ENDO TROCAR FIRST ENTRY KII FIOS ADV FIX 05X100MM CFF03

## (undated) DEVICE — NDL INSUFFLATION 13GA 150MM C2202

## (undated) DEVICE — LIGHT HANDLE X1 31140133

## (undated) DEVICE — ENDO TROCAR SLEEVE KII ADV FIXATION 05X100MM CFS02

## (undated) DEVICE — GLOVE PROTEXIS POWDER FREE SMT 7.5  2D72PT75X

## (undated) DEVICE — DRSG PRIMAPORE 02X3" 7133

## (undated) DEVICE — ANTIFOG SOLUTION W/FOAM PAD 31142527

## (undated) DEVICE — ESU GROUND PAD ADULT W/CORD E7507

## (undated) DEVICE — COVER CAMERA IN-LIGHT DISP LT-C02

## (undated) DEVICE — ENDO POUCH UNIVERSAL RETRIEVAL SYSTEM INZII 12/15MM CD004

## (undated) DEVICE — ENDO TROCAR OPTICAL ACCESS KII Z-THRD 15X100MM C0R37

## (undated) DEVICE — LINEN TOWEL PACK X6 WHITE 5487

## (undated) DEVICE — PREP CHLORAPREP 26ML TINTED ORANGE  260815

## (undated) DEVICE — SYR 30ML LL W/O NDL 302832

## (undated) DEVICE — LINEN TOWEL PACK X30 5481

## (undated) DEVICE — ESU LIGASURE MARYLAND VESSEL LAP 44CM XLONG LF1944

## (undated) DEVICE — STPL RELOAD REG TISSUE ECHELON 60 X 3.6MM BLUE GST60B

## (undated) DEVICE — DECANTER VIAL 2006S

## (undated) RX ORDER — HYDROMORPHONE HCL IN WATER/PF 6 MG/30 ML
PATIENT CONTROLLED ANALGESIA SYRINGE INTRAVENOUS
Status: DISPENSED
Start: 2021-10-26

## (undated) RX ORDER — SODIUM CHLORIDE, SODIUM LACTATE, POTASSIUM CHLORIDE, CALCIUM CHLORIDE 600; 310; 30; 20 MG/100ML; MG/100ML; MG/100ML; MG/100ML
INJECTION, SOLUTION INTRAVENOUS
Status: DISPENSED
Start: 2021-10-26

## (undated) RX ORDER — OXYCODONE HYDROCHLORIDE 5 MG/1
TABLET ORAL
Status: DISPENSED
Start: 2021-10-26

## (undated) RX ORDER — SCOLOPAMINE TRANSDERMAL SYSTEM 1 MG/1
PATCH, EXTENDED RELEASE TRANSDERMAL
Status: DISPENSED
Start: 2021-10-26

## (undated) RX ORDER — BUPIVACAINE HYDROCHLORIDE 2.5 MG/ML
INJECTION, SOLUTION EPIDURAL; INFILTRATION; INTRACAUDAL
Status: DISPENSED
Start: 2021-10-26

## (undated) RX ORDER — PROPOFOL 10 MG/ML
INJECTION, EMULSION INTRAVENOUS
Status: DISPENSED
Start: 2021-10-26

## (undated) RX ORDER — CEFAZOLIN SODIUM IN 0.9 % NACL 3 G/100 ML
INTRAVENOUS SOLUTION, PIGGYBACK (ML) INTRAVENOUS
Status: DISPENSED
Start: 2021-10-26